# Patient Record
Sex: FEMALE | Race: WHITE | NOT HISPANIC OR LATINO | Employment: FULL TIME | URBAN - METROPOLITAN AREA
[De-identification: names, ages, dates, MRNs, and addresses within clinical notes are randomized per-mention and may not be internally consistent; named-entity substitution may affect disease eponyms.]

---

## 2015-05-14 LAB — EXTERNAL HIV SCREEN: NORMAL

## 2017-01-11 DIAGNOSIS — R73.09 OTHER ABNORMAL GLUCOSE: ICD-10-CM

## 2017-01-11 DIAGNOSIS — Z00.00 ENCOUNTER FOR GENERAL ADULT MEDICAL EXAMINATION WITHOUT ABNORMAL FINDINGS: ICD-10-CM

## 2017-01-11 DIAGNOSIS — E78.01 FAMILIAL HYPERCHOLESTEROLEMIA: ICD-10-CM

## 2017-01-11 DIAGNOSIS — R00.2 PALPITATIONS: ICD-10-CM

## 2017-04-25 ENCOUNTER — ALLSCRIPTS OFFICE VISIT (OUTPATIENT)
Dept: OTHER | Facility: OTHER | Age: 32
End: 2017-04-25

## 2017-06-14 ENCOUNTER — TRANSCRIBE ORDERS (OUTPATIENT)
Dept: ADMINISTRATIVE | Facility: HOSPITAL | Age: 32
End: 2017-06-14

## 2017-06-14 ENCOUNTER — GENERIC CONVERSION - ENCOUNTER (OUTPATIENT)
Dept: OTHER | Facility: OTHER | Age: 32
End: 2017-06-14

## 2017-06-14 DIAGNOSIS — N63.0 LUMP OR MASS IN BREAST: Primary | ICD-10-CM

## 2017-06-19 ENCOUNTER — HOSPITAL ENCOUNTER (OUTPATIENT)
Dept: ULTRASOUND IMAGING | Facility: CLINIC | Age: 32
Discharge: HOME | End: 2017-06-19
Payer: COMMERCIAL

## 2017-06-19 DIAGNOSIS — N63.0 LUMP OR MASS IN BREAST: ICD-10-CM

## 2017-06-19 PROCEDURE — 76642 ULTRASOUND BREAST LIMITED: CPT

## 2017-07-17 ENCOUNTER — ALLSCRIPTS OFFICE VISIT (OUTPATIENT)
Dept: OTHER | Facility: OTHER | Age: 32
End: 2017-07-17

## 2018-01-08 ENCOUNTER — ALLSCRIPTS OFFICE VISIT (OUTPATIENT)
Dept: OTHER | Facility: OTHER | Age: 33
End: 2018-01-08

## 2018-01-09 NOTE — PROGRESS NOTES
Assessment   1  Acute sinusitis (461 9) (J01 90)    Plan   Acute sinusitis    · Follow-up PRN Evaluation and Treatment  Follow-up  Status: Complete  Done:    [id number] 03:16PM  PMH: History of acute sinusitis    · Azithromycin 500 MG Oral Tablet; TAKE 1 TABLET DAILY UNTIL FINISHED    Discussion/Summary      Rest and fluids, start abx and Flonase prn and Mucinex D with Claritin prn  Call if worse  The patient was counseled regarding  Chief Complaint   Pt complains of having sinus pressure  Did have a lot of post nasal drip over the holidays  Does have a lot of mucus in the morning that is yellow/green  History of Present Illness   HPI: Pt complains of having sinus pressure  Did have a lot of post nasal drip over the holidays  Does have a lot of mucus in the morning that is yellow/green  Used Claritin D prn but not better  Review of Systems        Constitutional: No fever, no chills, feels well, no tiredness, no recent weight gain or loss  ENT: as noted in HPI  Cardiovascular: no complaints of slow or fast heart rate, no chest pain, no palpitations, no leg claudication or lower extremity edema  Respiratory: no complaints of shortness of breath, no wheezing, no dyspnea on exertion, no orthopnea or PND  Breasts: no complaints of breast pain, breast lump or nipple discharge  Gastrointestinal: no complaints of abdominal pain, no constipation, no nausea or diarrhea, no vomiting, no bloody stools  Genitourinary: no complaints of dysuria, no incontinence, no pelvic pain, no dysmenorrhea, no vaginal discharge or abnormal vaginal bleeding  Musculoskeletal: no complaints of arthralgia, no myalgia, no joint swelling or stiffness, no limb pain or swelling  Integumentary: no complaints of skin rash or lesion, no itching or dry skin, no skin wounds  Neurological: no complaints of headache, no confusion, no numbness or tingling, no dizziness or fainting        Active Problems   1  Abnormal blood sugar (790 29) (R73 09)   2  Familial hypercholesterolemia (272 0) (E78 01)   3  Palpitations (785 1) (R00 2)   4  Psoriasis (696 1) (L40 9)    Past Medical History   1  History of acute sinusitis (V12 69) (Z87 09)    Family History   Mother    1  Family history of hypercholesterolemia (V18 19) (Z83 42)   2  Family history of hypertension (V17 49) (Z82 49)  Father    3  Family history of diabetes mellitus (V18 0) (Z83 3)    Social History    · Never a smoker   · Non-smoker (V49 89) (Z78 9)   · Occasional alcohol use    Surgical History   1  History of Breast Surgery    Current Meds    1  Fruit & Vegetable Daily Oral Capsule; Therapy: (Deisi Rice) to Recorded   2  Mometasone Furoate 0 1 % External Cream; APPLY SPARINGLY TO AFFECTED     AREA(S) ONCE DAILY; Therapy: [id number] to (Last Rx:[id number])  Requested for: [id number] Ordered   3  Ortho Tri-Cyclen Lo 0 18/0 215/0 25 MG-25 MCG Oral Tablet; TAKE 1 TABLET DAILY; Therapy: (Recorded:[id number]) to Recorded    Allergies   1  No Known Drug Allergies    Vitals    Recorded: 18KMX2691 38:79CO   Systolic 449   Diastolic 88   Height 5 ft 6 5 in   Weight 162 lb    BMI Calculated 25 76   BSA Calculated 1 84     Physical Exam        Constitutional      General appearance: No acute distress, well appearing and well nourished  Eyes      Conjunctiva and lids: No swelling, erythema or discharge  Pupils and irises: Equal, round and reactive to light  Ears, Nose, Mouth, and Throat      External inspection of ears and nose: Normal        Otoscopic examination: Tympanic membranes translucent with normal light reflex  Canals patent without erythema  Nasal mucosa, septum, and turbinates: Abnormal  -- sinusitis  Oropharynx: Abnormal  -- pnd  Pulmonary      Respiratory effort: No increased work of breathing or signs of respiratory distress  Auscultation of lungs: Clear to auscultation  Cardiovascular      Palpation of heart: Normal PMI, no thrills  Auscultation of heart: Normal rate and rhythm, normal S1 and S2, without murmurs  Examination of extremities for edema and/or varicosities: Normal        Carotid pulses: Normal        Lymphatic      Palpation of lymph nodes in neck: No lymphadenopathy  Musculoskeletal      Gait and station: Normal        Digits and nails: Normal without clubbing or cyanosis  Inspection/palpation of joints, bones, and muscles: Normal        Skin      Skin and subcutaneous tissue: Normal without rashes or lesions  Neurologic      Cranial nerves: Cranial nerves 2-12 intact  Reflexes: 2+ and symmetric  Sensation: No sensory loss         Psychiatric      Orientation to person, place, and time: Normal        Mood and affect: Normal           Signatures    Electronically signed by : Robert Garcia DO; Jan 8 2018  3:18PM EST                       (Author)

## 2018-01-10 NOTE — PROGRESS NOTES
Assessment    1  Encounter for preventive health examination (V70 0) (Z00 00)    Plan  Health Maintenance    · Follow-up visit in 1 year Evaluation and Treatment  Follow-up  Status: Hold For -  Scheduling  Requested for: 25Apr2017    Discussion/Summary  health maintenance visit Currently, she eats an adequate diet and has an adequate exercise regimen  the risks and benefits of cervical cancer screening were discussed Breast cancer screening: the risks and benefits of breast cancer screening were discussed  Screening lab work includes hemoglobin, glucose, lipid profile, thyroid function testing and 25-hydroxyvitamin D  She was advised to be evaluated by a dentist  Advice and education were given regarding nutrition, aerobic exercise, weight bearing exercise, weight loss, reproductive health, sunscreen use, self skin examination and seat belt use  Patient discussion: discussed with the patient  General PE, discussed labs, lose weight as directed, exercise and eat healthy, f-up with Dr Serafin Cano for skin exam and GYN as directed for pap smears and SBE  Sunscreen in summer  Stress about children  Discussed labs  The patient was counseled regarding  The treatment plan was reviewed with the patient/guardian  The patient/guardian understands and agrees with the treatment plan      Chief Complaint  Pt here for yearly physical exam and review of labs  Pt also asking about seeing a dermatologist for a whole body check  Pt  states she gets overwhelmed easily,  states it is anxiety  Active Problems    1  Abnormal blood sugar (790 29) (R73 09)   2  Acute sinusitis (461 9) (J01 90)   3  Familial hypercholesterolemia (272 0) (E78 01)   4   Palpitations (785 1) (R00 2)    Surgical History    · History of Breast Surgery    Family History  Mother    · Family history of hypercholesterolemia (V18 19) (Z83 42)   · Family history of hypertension (V17 49) (Z82 49)  Father    · Family history of diabetes mellitus (V18 0) (Z83 3)    Social History    · Never a smoker   · Non-smoker (V49 89) (Z78 9)   · Occasional alcohol use    Current Meds   1  Fruit & Vegetable Daily Oral Capsule; Therapy: (Recorded:25Apr2017) to Recorded    Allergies    1  No Known Drug Allergies    Vitals   Recorded: 36NHV5085 84:02SP   Systolic 575   Diastolic 70   Height 5 ft 7 in   Weight 167 lb 4 oz   BMI Calculated 26 2   BSA Calculated 1 87     Signatures   Electronically signed by : Keena Yanez DO;  Apr 25 2017  6:11PM EST                       (Author)

## 2018-01-12 VITALS
HEIGHT: 67 IN | SYSTOLIC BLOOD PRESSURE: 138 MMHG | DIASTOLIC BLOOD PRESSURE: 88 MMHG | WEIGHT: 165.25 LBS | BODY MASS INDEX: 25.94 KG/M2

## 2018-01-13 VITALS
SYSTOLIC BLOOD PRESSURE: 124 MMHG | HEIGHT: 67 IN | DIASTOLIC BLOOD PRESSURE: 70 MMHG | WEIGHT: 167.25 LBS | BODY MASS INDEX: 26.25 KG/M2

## 2018-01-23 VITALS
DIASTOLIC BLOOD PRESSURE: 88 MMHG | SYSTOLIC BLOOD PRESSURE: 124 MMHG | BODY MASS INDEX: 25.43 KG/M2 | WEIGHT: 162 LBS | HEIGHT: 67 IN

## 2018-02-15 ENCOUNTER — TELEPHONE (OUTPATIENT)
Dept: FAMILY MEDICINE CLINIC | Facility: CLINIC | Age: 33
End: 2018-02-15

## 2018-02-15 DIAGNOSIS — J32.9 SINUSITIS, UNSPECIFIED CHRONICITY, UNSPECIFIED LOCATION: Primary | ICD-10-CM

## 2018-02-15 RX ORDER — CEFDINIR 300 MG/1
300 CAPSULE ORAL EVERY 12 HOURS SCHEDULED
Qty: 14 CAPSULE | Refills: 0 | Status: SHIPPED | OUTPATIENT
Start: 2018-02-15 | End: 2018-02-22

## 2018-02-15 NOTE — TELEPHONE ENCOUNTER
Patient is starting with another sinus infection  She has a stuffy nose, pressure, and green mucus  She has been taking Claritin D and it is not helping  She is going away for 5 days and wants to know if something can be called into [facility] in [facility]    GO#[phone] please call patient if something is called into the pharmacy

## 2018-02-26 ENCOUNTER — TELEPHONE (OUTPATIENT)
Dept: FAMILY MEDICINE CLINIC | Facility: CLINIC | Age: 33
End: 2018-02-26

## 2018-02-26 NOTE — TELEPHONE ENCOUNTER
I would recommend Optho consult for this at [facility] for sight for conjunctivitis to make sure it heals appropriately

## 2018-02-26 NOTE — TELEPHONE ENCOUNTER
Patient called and wanted to know if she could get a call back to discuss her [facility] visit over the weekend  States she went there for pink eye

## 2018-02-26 NOTE — TELEPHONE ENCOUNTER
Spoke to patient and she stated that she went to [facility] twice and had pink eye  He daughter started with it on Friday  They gave her eye drops and ended up with a reaction of burning, swelling, and felt like sandpaper inside the eye  Went back Sunday and they gave medrol dose pack and a new Ofloxacin   3% eye drop  Now swelling has gone down and feeling better, but the eyes are still red and blood shot  How long do you think it will take to clear up? Is using 1 drop every 4 hours right now

## 2018-06-14 ENCOUNTER — TELEPHONE (OUTPATIENT)
Dept: FAMILY MEDICINE CLINIC | Facility: CLINIC | Age: 33
End: 2018-06-14

## 2018-06-14 NOTE — TELEPHONE ENCOUNTER
Pt called in and scheduled yearly pe for her on  8/6/18 with you, Burton Pickett would like to know if you will be ordering for her to get blood work done?   CB# [phone]

## 2018-06-15 DIAGNOSIS — E78.01 ESSENTIAL FAMILIAL HYPERCHOLESTEROLEMIA: ICD-10-CM

## 2018-06-15 DIAGNOSIS — R73.09 ABNORMAL BLOOD SUGAR: ICD-10-CM

## 2018-06-15 DIAGNOSIS — E55.9 VITAMIN D DEFICIENCY: Primary | ICD-10-CM

## 2018-08-24 RX ORDER — NORGESTIMATE AND ETHINYL ESTRADIOL
1 KIT DAILY
Refills: 4 | COMMUNITY
Start: 2018-07-27 | End: 2020-08-28

## 2018-08-24 RX ORDER — AMOXICILLIN 250 MG
CAPSULE ORAL
COMMUNITY

## 2018-08-24 RX ORDER — MOMETASONE FUROATE 1 MG/G
CREAM TOPICAL AS NEEDED
COMMUNITY
Start: 2017-07-17 | End: 2020-12-09 | Stop reason: SDUPTHER

## 2018-08-27 ENCOUNTER — OFFICE VISIT (OUTPATIENT)
Dept: FAMILY MEDICINE CLINIC | Facility: CLINIC | Age: 33
End: 2018-08-27
Payer: COMMERCIAL

## 2018-08-27 VITALS
DIASTOLIC BLOOD PRESSURE: 88 MMHG | HEIGHT: 67 IN | SYSTOLIC BLOOD PRESSURE: 132 MMHG | WEIGHT: 157 LBS | BODY MASS INDEX: 24.64 KG/M2

## 2018-08-27 DIAGNOSIS — E55.9 VITAMIN D DEFICIENCY: ICD-10-CM

## 2018-08-27 DIAGNOSIS — Z00.00 HEALTH CARE MAINTENANCE: Primary | ICD-10-CM

## 2018-08-27 PROCEDURE — 99395 PREV VISIT EST AGE 18-39: CPT | Performed by: FAMILY MEDICINE

## 2018-08-27 NOTE — PROGRESS NOTES
Assessment/Plan:  Chief Complaint   Patient presents with    Physical Exam     discuss reaction to eye drop for pink eye  Wondering if could be an allergic reation to sulfa  Patient Instructions   Here for general pe and is doing well and has stable dry skin  Get GYN exam as directed and SBE as directed  No problem-specific Assessment & Plan notes found for this encounter  Diagnoses and all orders for this visit:    Health care maintenance    Vitamin D deficiency    Other orders  -     Omega-3 Fatty Acids (OMEGA 3 PO); Take by mouth daily          Subjective:      Patient ID: Lynda Padilla is a 26 y o  female  Here for general PE and had a recent pink eye and had allergy likely due to polymyxin sulfate drops and did see [facility] and now everything is resolved  No cp or sob, or ha  Does do yoga and strength exercises and walking  The following portions of the patient's history were reviewed and updated as appropriate: allergies, current medications, past family history, past medical history, past social history, past surgical history and problem list     Review of Systems   Constitutional: Negative  HENT: Negative  Eyes: Negative  Respiratory: Negative  Cardiovascular: Negative  Gastrointestinal: Negative  Endocrine: Negative  Genitourinary: Negative  Musculoskeletal: Negative  Skin: Negative  Dry skin in elbows and knees  Allergic/Immunologic: Negative  Neurological: Negative  Hematological: Negative  Psychiatric/Behavioral: Negative  Objective:      /88   Ht 5' 7" (1 702 m)   Wt 71 2 kg (157 lb)   BMI 24 59 kg/m²          Physical Exam   Constitutional: She is oriented to person, place, and time  She appears well-developed and well-nourished  HENT:   Head: Normocephalic and atraumatic     Right Ear: External ear normal    Left Ear: External ear normal    Nose: Nose normal    Mouth/Throat: Oropharynx is clear and moist    Eyes: Conjunctivae and EOM are normal  Pupils are equal, round, and reactive to light  Neck: Normal range of motion  Neck supple  Cardiovascular: Normal rate, regular rhythm, normal heart sounds and intact distal pulses  Pulmonary/Chest: Effort normal and breath sounds normal    Abdominal: Soft  Bowel sounds are normal    Musculoskeletal: Normal range of motion  Neurological: She is alert and oriented to person, place, and time  She has normal reflexes  Skin: Skin is warm  Dry skin on elbows and left knee   Psychiatric: She has a normal mood and affect   Her behavior is normal

## 2018-08-27 NOTE — PATIENT INSTRUCTIONS
Here for general pe and is doing well and has stable dry skin  Get GYN exam as directed and SBE as directed

## 2018-10-19 ENCOUNTER — OFFICE VISIT (OUTPATIENT)
Dept: FAMILY MEDICINE CLINIC | Facility: CLINIC | Age: 33
End: 2018-10-19
Payer: COMMERCIAL

## 2018-10-19 VITALS
SYSTOLIC BLOOD PRESSURE: 136 MMHG | DIASTOLIC BLOOD PRESSURE: 88 MMHG | WEIGHT: 143.4 LBS | HEIGHT: 67 IN | BODY MASS INDEX: 22.51 KG/M2

## 2018-10-19 DIAGNOSIS — J32.9 SINUSITIS, UNSPECIFIED CHRONICITY, UNSPECIFIED LOCATION: Primary | ICD-10-CM

## 2018-10-19 PROCEDURE — 3008F BODY MASS INDEX DOCD: CPT | Performed by: FAMILY MEDICINE

## 2018-10-19 PROCEDURE — 99213 OFFICE O/P EST LOW 20 MIN: CPT | Performed by: FAMILY MEDICINE

## 2018-10-19 PROCEDURE — 1036F TOBACCO NON-USER: CPT | Performed by: FAMILY MEDICINE

## 2018-10-19 RX ORDER — AZITHROMYCIN 500 MG/1
500 TABLET, FILM COATED ORAL DAILY
Qty: 3 TABLET | Refills: 0 | Status: SHIPPED | OUTPATIENT
Start: 2018-10-19 | End: 2018-10-22

## 2018-10-19 NOTE — PATIENT INSTRUCTIONS
Likely sinusitis  Rest and fluids, may use Flonase prn and use Claritin D prn  Start abx as directed

## 2018-10-19 NOTE — PROGRESS NOTES
Assessment/Plan:  Chief Complaint   Patient presents with    Nasal Congestion     sinus pressure since the weekend  Has green mucus  Patient Instructions   Likely sinusitis  Rest and fluids, may use Flonase prn and use Claritin D prn  Start abx as directed  No problem-specific Assessment & Plan notes found for this encounter  Diagnoses and all orders for this visit:    Sinusitis, unspecified chronicity, unspecified location  -     azithromycin (ZITHROMAX) 500 MG tablet; Take 1 tablet (500 mg total) by mouth daily for 3 days          Subjective:      Patient ID: Samantha Bolden is a 26 y o  female  Here for sinusitis  Pt  Is feeling congested and has green mucous  Used Claritin D yesterday which helped  The following portions of the patient's history were reviewed and updated as appropriate: allergies, current medications, past family history, past medical history, past social history, past surgical history and problem list     Review of Systems   Constitutional: Negative  HENT: Positive for postnasal drip and sinus pressure  Eyes: Negative  Respiratory: Negative  Cardiovascular: Negative  Gastrointestinal: Negative  Endocrine: Negative  Genitourinary: Negative  Musculoskeletal: Negative  Skin: Negative  Allergic/Immunologic: Negative  Neurological: Negative  Hematological: Negative  Psychiatric/Behavioral: Negative  Objective:      /88   Ht 5' 7" (1 702 m)   Wt 65 kg (143 lb 6 4 oz)   BMI 22 46 kg/m²          Physical Exam   Constitutional: She is oriented to person, place, and time  She appears well-developed and well-nourished  HENT:   Head: Normocephalic and atraumatic  Right Ear: External ear normal    Left Ear: External ear normal    Nasal and sinus congestion  Eyes: Pupils are equal, round, and reactive to light  Conjunctivae and EOM are normal    Neck: Normal range of motion  Neck supple     Cardiovascular: Normal rate, regular rhythm, normal heart sounds and intact distal pulses  Pulmonary/Chest: Effort normal and breath sounds normal    Musculoskeletal: Normal range of motion  Neurological: She is alert and oriented to person, place, and time  She has normal reflexes  Skin: Skin is warm and dry  Psychiatric: She has a normal mood and affect   Her behavior is normal

## 2018-12-17 ENCOUNTER — APPOINTMENT (OUTPATIENT)
Dept: LAB | Facility: CLINIC | Age: 33
End: 2018-12-17
Payer: COMMERCIAL

## 2018-12-17 ENCOUNTER — OFFICE VISIT (OUTPATIENT)
Dept: FAMILY MEDICINE CLINIC | Facility: CLINIC | Age: 33
End: 2018-12-17
Payer: COMMERCIAL

## 2018-12-17 VITALS
HEIGHT: 67 IN | WEIGHT: 149.2 LBS | SYSTOLIC BLOOD PRESSURE: 120 MMHG | DIASTOLIC BLOOD PRESSURE: 72 MMHG | BODY MASS INDEX: 23.42 KG/M2

## 2018-12-17 DIAGNOSIS — Z23 ENCOUNTER FOR IMMUNIZATION: Primary | ICD-10-CM

## 2018-12-17 DIAGNOSIS — K58.9 IRRITABLE BOWEL SYNDROME, UNSPECIFIED TYPE: ICD-10-CM

## 2018-12-17 DIAGNOSIS — R10.9 ABDOMINAL CRAMPING: ICD-10-CM

## 2018-12-17 DIAGNOSIS — R14.0 BLOATED ABDOMEN: ICD-10-CM

## 2018-12-17 DIAGNOSIS — K30 INDIGESTION: ICD-10-CM

## 2018-12-17 LAB
ALBUMIN SERPL BCP-MCNC: 4 G/DL (ref 3.5–5)
ALP SERPL-CCNC: 32 U/L (ref 46–116)
ALT SERPL W P-5'-P-CCNC: 19 U/L (ref 12–78)
ANION GAP SERPL CALCULATED.3IONS-SCNC: 6 MMOL/L (ref 4–13)
AST SERPL W P-5'-P-CCNC: 13 U/L (ref 5–45)
BASOPHILS # BLD AUTO: 0.03 THOUSANDS/ΜL (ref 0–0.1)
BASOPHILS NFR BLD AUTO: 1 % (ref 0–1)
BILIRUB SERPL-MCNC: 0.58 MG/DL (ref 0.2–1)
BUN SERPL-MCNC: 9 MG/DL (ref 5–25)
CALCIUM SERPL-MCNC: 8.7 MG/DL (ref 8.3–10.1)
CHLORIDE SERPL-SCNC: 105 MMOL/L (ref 100–108)
CO2 SERPL-SCNC: 25 MMOL/L (ref 21–32)
CREAT SERPL-MCNC: 0.94 MG/DL (ref 0.6–1.3)
EOSINOPHIL # BLD AUTO: 0.04 THOUSAND/ΜL (ref 0–0.61)
EOSINOPHIL NFR BLD AUTO: 1 % (ref 0–6)
ERYTHROCYTE [DISTWIDTH] IN BLOOD BY AUTOMATED COUNT: 11.7 % (ref 11.6–15.1)
GFR SERPL CREATININE-BSD FRML MDRD: 79 ML/MIN/1.73SQ M
GLUCOSE P FAST SERPL-MCNC: 92 MG/DL (ref 65–99)
HCT VFR BLD AUTO: 44.3 % (ref 34.8–46.1)
HGB BLD-MCNC: 14.7 G/DL (ref 11.5–15.4)
IMM GRANULOCYTES # BLD AUTO: 0.01 THOUSAND/UL (ref 0–0.2)
IMM GRANULOCYTES NFR BLD AUTO: 0 % (ref 0–2)
LYMPHOCYTES # BLD AUTO: 1.47 THOUSANDS/ΜL (ref 0.6–4.47)
LYMPHOCYTES NFR BLD AUTO: 31 % (ref 14–44)
MCH RBC QN AUTO: 30.4 PG (ref 26.8–34.3)
MCHC RBC AUTO-ENTMCNC: 33.2 G/DL (ref 31.4–37.4)
MCV RBC AUTO: 92 FL (ref 82–98)
MONOCYTES # BLD AUTO: 0.21 THOUSAND/ΜL (ref 0.17–1.22)
MONOCYTES NFR BLD AUTO: 4 % (ref 4–12)
NEUTROPHILS # BLD AUTO: 3.04 THOUSANDS/ΜL (ref 1.85–7.62)
NEUTS SEG NFR BLD AUTO: 63 % (ref 43–75)
NRBC BLD AUTO-RTO: 0 /100 WBCS
PLATELET # BLD AUTO: 232 THOUSANDS/UL (ref 149–390)
PMV BLD AUTO: 10.9 FL (ref 8.9–12.7)
POTASSIUM SERPL-SCNC: 3.5 MMOL/L (ref 3.5–5.3)
PROT SERPL-MCNC: 7 G/DL (ref 6.4–8.2)
RBC # BLD AUTO: 4.84 MILLION/UL (ref 3.81–5.12)
SODIUM SERPL-SCNC: 136 MMOL/L (ref 136–145)
T4 FREE SERPL-MCNC: 1.08 NG/DL (ref 0.76–1.46)
TSH SERPL DL<=0.05 MIU/L-ACNC: 3.55 UIU/ML (ref 0.36–3.74)
WBC # BLD AUTO: 4.8 THOUSAND/UL (ref 4.31–10.16)

## 2018-12-17 PROCEDURE — 85025 COMPLETE CBC W/AUTO DIFF WBC: CPT

## 2018-12-17 PROCEDURE — 83516 IMMUNOASSAY NONANTIBODY: CPT

## 2018-12-17 PROCEDURE — 36415 COLL VENOUS BLD VENIPUNCTURE: CPT

## 2018-12-17 PROCEDURE — 1036F TOBACCO NON-USER: CPT | Performed by: FAMILY MEDICINE

## 2018-12-17 PROCEDURE — 3008F BODY MASS INDEX DOCD: CPT | Performed by: FAMILY MEDICINE

## 2018-12-17 PROCEDURE — 84439 ASSAY OF FREE THYROXINE: CPT | Performed by: FAMILY MEDICINE

## 2018-12-17 PROCEDURE — 82784 ASSAY IGA/IGD/IGG/IGM EACH: CPT

## 2018-12-17 PROCEDURE — 80053 COMPREHEN METABOLIC PANEL: CPT

## 2018-12-17 PROCEDURE — 84443 ASSAY THYROID STIM HORMONE: CPT

## 2018-12-17 PROCEDURE — 86255 FLUORESCENT ANTIBODY SCREEN: CPT

## 2018-12-17 PROCEDURE — 99214 OFFICE O/P EST MOD 30 MIN: CPT | Performed by: FAMILY MEDICINE

## 2018-12-17 RX ORDER — DICYCLOMINE HCL 20 MG
20 TABLET ORAL EVERY 6 HOURS
Qty: 30 TABLET | Refills: 0 | Status: SHIPPED | OUTPATIENT
Start: 2018-12-17 | End: 2022-03-15

## 2018-12-17 NOTE — PATIENT INSTRUCTIONS
Here for GI issues with bloating and Irregular BM's  Hx of stress and anxiety  Concerned about this and will try Bentyl for bloated feeling and also check stool culture and O&P  Rec  Fiber in diet  Rec  GI consult at [facility] if not better and monitor for blood in stool and increased abdominal pain  Stay well hydrated

## 2018-12-17 NOTE — PROGRESS NOTES
Assessment/Plan:  Chief Complaint   Patient presents with    Constipation     noticing some digestion changes since traveling over [facility]  When away was feeling a little constipated and now for the last 2 weeks has been going differently since being home  Does not have diarrhea, but is slightly different  Patient Instructions   Here for GI issues with bloating and Irregular BM's  Hx of stress and anxiety  Concerned about this and will try Bentyl for bloated feeling and also check stool culture and O&P  Rec  Fiber in diet  Rec  GI consult at [facility] if not better and monitor for blood in stool and increased abdominal pain  Stay well hydrated  No problem-specific Assessment & Plan notes found for this encounter  Diagnoses and all orders for this visit:    Encounter for immunization  -     SYRINGE/SINGLE-DOSE VIAL: influenza vaccine, [id number], quadrivalent, 0 5 mL, preservative-free (AFLURIA, FLUARIX, FLULAVAL, FLUZONE)    Indigestion  -     Stool Enteric Bacterial Panel by PCR; Future  -     Ova and Parasites, Conc/Perm Smear, 3 Spec; Future  -     Comprehensive metabolic panel; Future  -     CBC and differential; Future  -     TSH, 3rd generation; Future  -     T4, free  -     Celiac Disease Antibody Profile; Future    Bloated abdomen  -     Stool Enteric Bacterial Panel by PCR; Future  -     Ova and Parasites, Conc/Perm Smear, 3 Spec; Future  -     Comprehensive metabolic panel; Future  -     CBC and differential; Future  -     TSH, 3rd generation; Future  -     T4, free  -     Celiac Disease Antibody Profile; Future    Abdominal cramping  -     Stool Enteric Bacterial Panel by PCR; Future  -     Ova and Parasites, Conc/Perm Smear, 3 Spec; Future  -     Comprehensive metabolic panel; Future  -     CBC and differential; Future  -     TSH, 3rd generation; Future  -     T4, free  -     Celiac Disease Antibody Profile;  Future    Irritable bowel syndrome, unspecified type  - dicyclomine (BENTYL) 20 mg tablet; Take 1 tablet (20 mg total) by mouth every 6 (six) hours  -     Stool Enteric Bacterial Panel by PCR; Future  -     Ova and Parasites, Conc/Perm Smear, 3 Spec; Future  -     Comprehensive metabolic panel; Future  -     CBC and differential; Future  -     TSH, 3rd generation; Future  -     T4, free  -     Celiac Disease Antibody Profile; Future          Subjective:      Patient ID: Brinda Vallejo is a 26 y o  female  Constipation (noticing some digestion changes since traveling over [facility]  When away was feeling a little constipated and now for the last 2 weeks has been going differently since being home  Does not have diarrhea, but is slightly different  For 4 days when she returned had some cramping and poor appetite and had multiple times a day going to bathroom  Has had an arugala salad  No fever or chills and stools are normal  Not constipated  Irregular frequency of BM's  Has stress and struggles with stress and anxiety around holidays  Tried to avoid gluten  The following portions of the patient's history were reviewed and updated as appropriate: allergies, current medications, past family history, past medical history, past social history, past surgical history and problem list     Review of Systems   Constitutional: Negative  HENT: Negative  Eyes: Negative  Respiratory: Negative  Cardiovascular: Negative  Gastrointestinal:        Digestion issues, at times bloated and irregular BM's   Endocrine: Negative  Genitourinary: Negative  Musculoskeletal: Negative  Skin: Negative  Allergic/Immunologic: Negative  Neurological: Negative  Hematological: Negative  Psychiatric/Behavioral: Negative  Objective:      /72   Ht 5' 7" (1 702 m)   Wt 67 7 kg (149 lb 3 2 oz)   BMI 23 37 kg/m²          Physical Exam   Constitutional: She is oriented to person, place, and time  She appears well-developed and well-nourished     HENT: Head: Normocephalic and atraumatic  Right Ear: External ear normal    Left Ear: External ear normal    Nose: Nose normal    Mouth/Throat: Oropharynx is clear and moist    Eyes: Pupils are equal, round, and reactive to light  Conjunctivae and EOM are normal    Neck: Normal range of motion  Neck supple  Cardiovascular: Normal rate, regular rhythm, normal heart sounds and intact distal pulses  Pulmonary/Chest: Effort normal and breath sounds normal    Abdominal: Soft  Bowel sounds are normal    Musculoskeletal: Normal range of motion  Neurological: She is alert and oriented to person, place, and time  She has normal reflexes  Skin: Skin is warm and dry  Psychiatric: She has a normal mood and affect   Her behavior is normal

## 2018-12-18 ENCOUNTER — APPOINTMENT (OUTPATIENT)
Dept: LAB | Facility: CLINIC | Age: 33
End: 2018-12-18
Payer: COMMERCIAL

## 2018-12-18 DIAGNOSIS — K30 MILD DIETARY INDIGESTION: Primary | ICD-10-CM

## 2018-12-18 DIAGNOSIS — R10.9 STOMACH ACHE: ICD-10-CM

## 2018-12-18 DIAGNOSIS — K58.9 IRRITABLE BOWEL SYNDROME, UNSPECIFIED TYPE: ICD-10-CM

## 2018-12-18 DIAGNOSIS — R14.0 ABDOMINAL DISTENTION: ICD-10-CM

## 2018-12-18 LAB
CAMPYLOBACTER DNA SPEC NAA+PROBE: NORMAL
ENDOMYSIUM IGA SER QL: NEGATIVE
GLIADIN PEPTIDE IGA SER-ACNC: 2 UNITS (ref 0–19)
GLIADIN PEPTIDE IGG SER-ACNC: 2 UNITS (ref 0–19)
IGA SERPL-MCNC: 96 MG/DL (ref 87–352)
SALMONELLA DNA SPEC QL NAA+PROBE: NORMAL
SHIGA TOXIN STX GENE SPEC NAA+PROBE: NORMAL
SHIGELLA DNA SPEC QL NAA+PROBE: NORMAL
TTG IGA SER-ACNC: <2 U/ML (ref 0–3)
TTG IGG SER-ACNC: <2 U/ML (ref 0–5)

## 2018-12-18 PROCEDURE — 87209 SMEAR COMPLEX STAIN: CPT

## 2018-12-18 PROCEDURE — 87177 OVA AND PARASITES SMEARS: CPT

## 2018-12-18 PROCEDURE — 87505 NFCT AGENT DETECTION GI: CPT

## 2018-12-21 LAB — O+P STL CONC: NORMAL

## 2019-01-09 ENCOUNTER — OFFICE VISIT (OUTPATIENT)
Dept: FAMILY MEDICINE CLINIC | Facility: CLINIC | Age: 34
End: 2019-01-09
Payer: COMMERCIAL

## 2019-01-09 VITALS
HEIGHT: 67 IN | WEIGHT: 151.6 LBS | DIASTOLIC BLOOD PRESSURE: 80 MMHG | SYSTOLIC BLOOD PRESSURE: 124 MMHG | BODY MASS INDEX: 23.79 KG/M2

## 2019-01-09 DIAGNOSIS — K59.00 CONSTIPATION, UNSPECIFIED CONSTIPATION TYPE: ICD-10-CM

## 2019-01-09 DIAGNOSIS — K58.9 IRRITABLE BOWEL SYNDROME, UNSPECIFIED TYPE: ICD-10-CM

## 2019-01-09 DIAGNOSIS — Z23 ENCOUNTER FOR IMMUNIZATION: Primary | ICD-10-CM

## 2019-01-09 PROCEDURE — 1036F TOBACCO NON-USER: CPT | Performed by: FAMILY MEDICINE

## 2019-01-09 PROCEDURE — 3008F BODY MASS INDEX DOCD: CPT | Performed by: FAMILY MEDICINE

## 2019-01-09 PROCEDURE — 99214 OFFICE O/P EST MOD 30 MIN: CPT | Performed by: FAMILY MEDICINE

## 2019-01-09 NOTE — PATIENT INSTRUCTIONS
Here for constipation and IBS f-up, labs and stool tests wnl, she is now feeling better and is having regular BM's  She has worked at [employer] and is considering a job change as this may be affecting her health  Eat healthy and exercise as directed  Call if any problems and she is considering stopping her BCP

## 2019-01-09 NOTE — PROGRESS NOTES
Assessment/Plan:  Chief Complaint   Patient presents with    Follow-up     3 weeks; no concerns     Patient Instructions   Here for constipation and IBS f-up, labs and stool tests wnl, she is now feeling better and is having regular BM's  She has worked at [employer] and is considering a job change as this may be affecting her health  Eat healthy and exercise as directed  Call if any problems and she is considering stopping her BCP  No problem-specific Assessment & Plan notes found for this encounter  Diagnoses and all orders for this visit:    Encounter for immunization  -     SYRINGE/SINGLE-DOSE VIAL: influenza vaccine, [id number], quadrivalent, 0 5 mL, preservative-free (AFLURIA, FLUARIX, FLULAVAL, FLUZONE)    Irritable bowel syndrome, unspecified type    Constipation, unspecified constipation type          Subjective:      Patient ID: Osman Pennington is a 26 y o  female  Follow-up (3 weeks; no concerns) never took Bentyl  She is better and had 15 days off for [place] and felt better  Job is stressful and this may have caused GI issues  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  Stress at work, new boss recently and gerardo last September and commute and children and big recent travel could have caused her to have these GI complaints  The following portions of the patient's history were reviewed and updated as appropriate: allergies, current medications, past family history, past medical history, past social history, past surgical history and problem list     Review of Systems   Constitutional: Negative  HENT: Negative  Eyes: Negative  Respiratory: Negative  Cardiovascular: Negative  Gastrointestinal: Negative  Endocrine: Negative  Genitourinary: Negative  Musculoskeletal: Negative  Skin: Negative  Allergic/Immunologic: Negative  Neurological: Negative  Hematological: Negative  Psychiatric/Behavioral: Negative            Objective:      /80 (BP Location: Left arm, Patient Position: Sitting, Cuff Size: Standard)   Ht 5' 7" (1 702 m)   Wt 68 8 kg (151 lb 9 6 oz)   BMI 23 74 kg/m²          Physical Exam   Constitutional: She is oriented to person, place, and time  She appears well-developed and well-nourished  HENT:   Head: Normocephalic and atraumatic  Right Ear: External ear normal    Left Ear: External ear normal    Nose: Nose normal    Mouth/Throat: Oropharynx is clear and moist    Eyes: Pupils are equal, round, and reactive to light  Conjunctivae and EOM are normal    Neck: Normal range of motion  Neck supple  Cardiovascular: Normal rate, regular rhythm, normal heart sounds and intact distal pulses  Pulmonary/Chest: Effort normal and breath sounds normal    Musculoskeletal: Normal range of motion  Neurological: She is alert and oriented to person, place, and time  She has normal reflexes  Skin: Skin is warm and dry  Psychiatric: She has a normal mood and affect   Her behavior is normal

## 2019-08-20 ENCOUNTER — TRANSCRIBE ORDERS (OUTPATIENT)
Dept: FAMILY MEDICINE CLINIC | Facility: CLINIC | Age: 34
End: 2019-08-20

## 2019-08-20 ENCOUNTER — TELEPHONE (OUTPATIENT)
Dept: FAMILY MEDICINE CLINIC | Facility: CLINIC | Age: 34
End: 2019-08-20

## 2019-08-20 DIAGNOSIS — I15.9 SECONDARY HYPERTENSION: Primary | ICD-10-CM

## 2019-08-20 DIAGNOSIS — R73.09 ABNORMAL BLOOD SUGAR: ICD-10-CM

## 2019-08-20 NOTE — TELEPHONE ENCOUNTER
Patient is scheduled for a physical on 9/10  Do you want her to have blood work done prior?     Please mail

## 2019-09-30 ENCOUNTER — OFFICE VISIT (OUTPATIENT)
Dept: FAMILY MEDICINE CLINIC | Facility: CLINIC | Age: 34
End: 2019-09-30
Payer: COMMERCIAL

## 2019-09-30 VITALS
TEMPERATURE: 97.8 F | HEIGHT: 68 IN | OXYGEN SATURATION: 97 % | DIASTOLIC BLOOD PRESSURE: 76 MMHG | HEART RATE: 78 BPM | WEIGHT: 153.8 LBS | SYSTOLIC BLOOD PRESSURE: 128 MMHG | BODY MASS INDEX: 23.31 KG/M2

## 2019-09-30 DIAGNOSIS — Z00.00 HEALTH CARE MAINTENANCE: Primary | ICD-10-CM

## 2019-09-30 PROCEDURE — 99395 PREV VISIT EST AGE 18-39: CPT | Performed by: FAMILY MEDICINE

## 2019-09-30 RX ORDER — DIPHENOXYLATE HYDROCHLORIDE AND ATROPINE SULFATE 2.5; .025 MG/1; MG/1
1 TABLET ORAL DAILY
COMMUNITY

## 2019-09-30 NOTE — PROGRESS NOTES
Assessment/Plan:  Chief Complaint   Patient presents with    Annual Exam     Patient Instructions   Here for general PE and is doing well and labs excellent, preventive medicine discussed  Recheck yearly for General PE and monitor daily for possible GI lactose intolerance  No problem-specific Assessment & Plan notes found for this encounter  Diagnoses and all orders for this visit:    Health care maintenance    Other orders  -     Probiotic Product (PROBIOTIC DAILY PO); Take by mouth  -     multivitamin (THERAGRAN) TABS; Take 1 tablet by mouth daily          Subjective:      Patient ID: Ankit Rdz is a 37 y o  female  Annual Exam No cp or sob, or ha, and here to review labs and does yoga  Sees GYN as directed and does SBE  Diet and exercise  Sees dentist as directed  The following portions of the patient's history were reviewed and updated as appropriate: allergies, current medications, past family history, past medical history, past social history, past surgical history and problem list     Review of Systems   Constitutional: Negative  HENT: Negative  Eyes: Negative  Respiratory: Negative  Cardiovascular: Negative  Gastrointestinal: Negative  Endocrine: Negative  Genitourinary: Negative  Musculoskeletal: Negative  Skin: Negative  Allergic/Immunologic: Negative  Neurological: Negative  Hematological: Negative  Psychiatric/Behavioral: Negative  Objective:      /76 (BP Location: Left arm, Patient Position: Sitting, Cuff Size: Adult)   Pulse 78   Temp 97 8 °F (36 6 °C) (Temporal)   Ht 5' 7 5" (1 715 m)   Wt 69 8 kg (153 lb 12 8 oz)   SpO2 97%   BMI 23 73 kg/m²          Physical Exam   Constitutional: She is oriented to person, place, and time  She appears well-developed and well-nourished  HENT:   Head: Normocephalic and atraumatic     Right Ear: External ear normal    Left Ear: External ear normal    Nose: Nose normal    Mouth/Throat: Oropharynx is clear and moist    Eyes: Pupils are equal, round, and reactive to light  Conjunctivae and EOM are normal    Neck: Normal range of motion  Neck supple  Cardiovascular: Normal rate, regular rhythm, normal heart sounds and intact distal pulses  Pulmonary/Chest: Effort normal and breath sounds normal    Musculoskeletal: Normal range of motion  Neurological: She is alert and oriented to person, place, and time  She has normal reflexes  Skin: Skin is warm and dry  Psychiatric: She has a normal mood and affect   Her behavior is normal

## 2019-09-30 NOTE — PATIENT INSTRUCTIONS
Here for general PE and is doing well and labs excellent, preventive medicine discussed  Recheck yearly for General PE and monitor daily for possible GI lactose intolerance

## 2019-10-21 ENCOUNTER — OFFICE VISIT (OUTPATIENT)
Dept: FAMILY MEDICINE CLINIC | Facility: CLINIC | Age: 34
End: 2019-10-21
Payer: COMMERCIAL

## 2019-10-21 VITALS
WEIGHT: 155.6 LBS | BODY MASS INDEX: 23.58 KG/M2 | HEART RATE: 92 BPM | SYSTOLIC BLOOD PRESSURE: 122 MMHG | OXYGEN SATURATION: 100 % | TEMPERATURE: 97.7 F | HEIGHT: 68 IN | DIASTOLIC BLOOD PRESSURE: 78 MMHG

## 2019-10-21 DIAGNOSIS — J32.9 SINUSITIS, UNSPECIFIED CHRONICITY, UNSPECIFIED LOCATION: Primary | ICD-10-CM

## 2019-10-21 DIAGNOSIS — R09.81 NASAL CONGESTION: ICD-10-CM

## 2019-10-21 PROCEDURE — 99213 OFFICE O/P EST LOW 20 MIN: CPT | Performed by: FAMILY MEDICINE

## 2019-10-21 PROCEDURE — 3008F BODY MASS INDEX DOCD: CPT | Performed by: FAMILY MEDICINE

## 2019-10-21 RX ORDER — AZITHROMYCIN 500 MG/1
500 TABLET, FILM COATED ORAL DAILY
Qty: 3 TABLET | Refills: 0 | Status: SHIPPED | OUTPATIENT
Start: 2019-10-21 | End: 2019-10-24

## 2019-10-21 RX ORDER — FLUTICASONE PROPIONATE 50 MCG
2 SPRAY, SUSPENSION (ML) NASAL DAILY
Qty: 1 BOTTLE | Refills: 0 | Status: SHIPPED | OUTPATIENT
Start: 2019-10-21 | End: 2022-03-15

## 2019-10-21 NOTE — PROGRESS NOTES
Assessment/Plan:  Chief Complaint   Patient presents with    Possible sinus infection     Patient Instructions   Rest and fluids and start abx and Fluticasone and Claritin D prn congestion and call if worse  No problem-specific Assessment & Plan notes found for this encounter  Diagnoses and all orders for this visit:    Sinusitis, unspecified chronicity, unspecified location  -     azithromycin (ZITHROMAX) 500 MG tablet; Take 1 tablet (500 mg total) by mouth daily for 3 days  -     fluticasone (FLONASE) 50 mcg/act nasal spray; 2 sprays into each nostril daily Prn sinusitis    Nasal congestion  -     fluticasone (FLONASE) 50 mcg/act nasal spray; 2 sprays into each nostril daily Prn sinusitis          Subjective:      Patient ID: Magi Conway is a 37 y o  female  Possible sinus infection , left nasal passage is congested and also has more pressure left maxillary sinus  No drainage  Had a cold previously 2 weeks ago for several days with congestion and took sudafed for 4 days  The following portions of the patient's history were reviewed and updated as appropriate: allergies, current medications, past family history, past medical history, past social history, past surgical history and problem list     Review of Systems   Constitutional: Negative  HENT: Positive for congestion, sinus pressure and sinus pain  Eyes: Negative  Respiratory: Negative  Cardiovascular: Negative  Gastrointestinal: Negative  Endocrine: Negative  Genitourinary: Negative  Musculoskeletal: Negative  Skin: Negative  Allergic/Immunologic: Negative  Neurological: Negative  Hematological: Negative  Psychiatric/Behavioral: Negative            Objective:      /78 (BP Location: Left arm, Patient Position: Sitting, Cuff Size: Adult)   Pulse 92   Temp 97 7 °F (36 5 °C) (Temporal)   Ht 5' 7 5" (1 715 m)   Wt 70 6 kg (155 lb 9 6 oz)   SpO2 100%   BMI 24 01 kg/m²          Physical Exam Constitutional: She is oriented to person, place, and time  She appears well-developed and well-nourished  HENT:   Head: Normocephalic and atraumatic  Right Ear: External ear normal    Left Ear: External ear normal    Sinus tenderness and congestion, nasal congestion  Eyes: Pupils are equal, round, and reactive to light  Conjunctivae and EOM are normal    Neck: Normal range of motion  Neck supple  Cardiovascular: Normal rate, regular rhythm, normal heart sounds and intact distal pulses  Pulmonary/Chest: Effort normal and breath sounds normal    Musculoskeletal: Normal range of motion  Neurological: She is alert and oriented to person, place, and time  She has normal reflexes  Skin: Skin is warm and dry  Psychiatric: She has a normal mood and affect   Her behavior is normal

## 2019-11-26 ENCOUNTER — TRANSCRIBE ORDERS (OUTPATIENT)
Dept: ADMINISTRATIVE | Facility: HOSPITAL | Age: 34
End: 2019-11-26

## 2019-11-26 DIAGNOSIS — N63.0 LUMP OR MASS IN BREAST: Primary | ICD-10-CM

## 2019-12-03 ENCOUNTER — HOSPITAL ENCOUNTER (OUTPATIENT)
Dept: ULTRASOUND IMAGING | Facility: CLINIC | Age: 34
Discharge: HOME | End: 2019-12-03
Payer: COMMERCIAL

## 2019-12-03 ENCOUNTER — HOSPITAL ENCOUNTER (OUTPATIENT)
Dept: MAMMOGRAPHY | Facility: CLINIC | Age: 34
Discharge: HOME | End: 2019-12-03
Payer: COMMERCIAL

## 2019-12-03 VITALS — WEIGHT: 152 LBS | BODY MASS INDEX: 23.04 KG/M2 | HEIGHT: 68 IN

## 2019-12-03 DIAGNOSIS — N63.0 LUMP OR MASS IN BREAST: ICD-10-CM

## 2019-12-03 PROCEDURE — 77066 DX MAMMO INCL CAD BI: CPT

## 2019-12-03 PROCEDURE — 76642 ULTRASOUND BREAST LIMITED: CPT

## 2019-12-03 PROCEDURE — G0279 TOMOSYNTHESIS, MAMMO: HCPCS

## 2019-12-20 ENCOUNTER — OFFICE VISIT (OUTPATIENT)
Dept: FAMILY MEDICINE CLINIC | Facility: CLINIC | Age: 34
End: 2019-12-20
Payer: COMMERCIAL

## 2019-12-20 VITALS
HEIGHT: 67 IN | BODY MASS INDEX: 24.3 KG/M2 | TEMPERATURE: 98.5 F | WEIGHT: 154.8 LBS | OXYGEN SATURATION: 98 % | DIASTOLIC BLOOD PRESSURE: 68 MMHG | SYSTOLIC BLOOD PRESSURE: 122 MMHG | RESPIRATION RATE: 17 BRPM | HEART RATE: 100 BPM

## 2019-12-20 DIAGNOSIS — K14.6 TONGUE PAIN: ICD-10-CM

## 2019-12-20 DIAGNOSIS — J04.0 LARYNGITIS: ICD-10-CM

## 2019-12-20 DIAGNOSIS — R05.9 COUGH: Primary | ICD-10-CM

## 2019-12-20 PROCEDURE — 1036F TOBACCO NON-USER: CPT | Performed by: FAMILY MEDICINE

## 2019-12-20 PROCEDURE — 3008F BODY MASS INDEX DOCD: CPT | Performed by: FAMILY MEDICINE

## 2019-12-20 PROCEDURE — 99213 OFFICE O/P EST LOW 20 MIN: CPT | Performed by: FAMILY MEDICINE

## 2019-12-20 RX ORDER — AZITHROMYCIN 250 MG/1
TABLET, FILM COATED ORAL
Qty: 6 TABLET | Refills: 0 | Status: SHIPPED | OUTPATIENT
Start: 2019-12-20 | End: 2019-12-25

## 2019-12-20 NOTE — PATIENT INSTRUCTIONS
Rest and fluids, start abx and also take Dimetapp DM cold and cough  Rest voice  Call if any problems

## 2019-12-20 NOTE — PROGRESS NOTES
Assessment/Plan:  Chief Complaint   Patient presents with    Laryngitis     Pt c/o loss of voice   patrinstr      No problem-specific Assessment & Plan notes found for this encounter  Diagnoses and all orders for this visit:    Cough  -     azithromycin (ZITHROMAX) 250 mg tablet; Take 2 tablets today then 1 tablet daily x 4 days    Laryngitis    Tongue pain          Subjective:      Patient ID: Margarita Shoemaker is a 37 y o  female  Laryngitis (Pt c/o loss of voice  ) Upper chest congestion and laryngitis and back of tongue pain  No cp or sob, or ha  No fever or chills  Had a sore throat last week  The following portions of the patient's history were reviewed and updated as appropriate: allergies, current medications, past family history, past medical history, past social history, past surgical history and problem list     Review of Systems   Constitutional: Negative  HENT:        Laryngitis, pnd and tongue in back of tongue hurts   Eyes: Negative  Respiratory: Positive for cough (cough last week)  Cardiovascular: Negative  Gastrointestinal: Negative  Endocrine: Negative  Genitourinary: Negative  Musculoskeletal: Negative  Skin: Negative  Allergic/Immunologic: Negative  Neurological: Negative  Hematological: Negative  Psychiatric/Behavioral: Negative  Objective:      /68 (BP Location: Left arm, Patient Position: Sitting, Cuff Size: Adult)   Pulse 100   Temp 98 5 °F (36 9 °C) (Temporal)   Resp 17   Ht 5' 6 73" (1 695 m)   Wt 70 2 kg (154 lb 12 8 oz)   SpO2 98%   BMI 24 44 kg/m²          Physical Exam   Constitutional: She is oriented to person, place, and time  She appears well-developed and well-nourished  HENT:   Head: Normocephalic and atraumatic     Right Ear: External ear normal    Left Ear: External ear normal    Nose: Nose normal    Mouth/Throat: Oropharynx is clear and moist    laryngitis   Eyes: Pupils are equal, round, and reactive to light  Conjunctivae and EOM are normal    Neck: Normal range of motion  Neck supple  Cardiovascular: Normal rate, regular rhythm, normal heart sounds and intact distal pulses  Pulmonary/Chest: Effort normal and breath sounds normal    Cough better   Musculoskeletal: Normal range of motion  Neurological: She is alert and oriented to person, place, and time  She has normal reflexes  Skin: Skin is warm and dry  Psychiatric: She has a normal mood and affect   Her behavior is normal

## 2020-08-28 ENCOUNTER — OFFICE VISIT (OUTPATIENT)
Dept: FAMILY MEDICINE CLINIC | Facility: CLINIC | Age: 35
End: 2020-08-28
Payer: COMMERCIAL

## 2020-08-28 VITALS
HEIGHT: 67 IN | TEMPERATURE: 97.5 F | BODY MASS INDEX: 24.92 KG/M2 | OXYGEN SATURATION: 99 % | WEIGHT: 158.8 LBS | HEART RATE: 64 BPM | SYSTOLIC BLOOD PRESSURE: 118 MMHG | RESPIRATION RATE: 16 BRPM | DIASTOLIC BLOOD PRESSURE: 80 MMHG

## 2020-08-28 DIAGNOSIS — J30.2 SEASONAL ALLERGIES: ICD-10-CM

## 2020-08-28 DIAGNOSIS — J32.9 SINUSITIS, UNSPECIFIED CHRONICITY, UNSPECIFIED LOCATION: Primary | ICD-10-CM

## 2020-08-28 PROCEDURE — 3725F SCREEN DEPRESSION PERFORMED: CPT | Performed by: FAMILY MEDICINE

## 2020-08-28 PROCEDURE — 1036F TOBACCO NON-USER: CPT | Performed by: FAMILY MEDICINE

## 2020-08-28 PROCEDURE — 99213 OFFICE O/P EST LOW 20 MIN: CPT | Performed by: FAMILY MEDICINE

## 2020-08-28 PROCEDURE — 3008F BODY MASS INDEX DOCD: CPT | Performed by: FAMILY MEDICINE

## 2020-08-28 RX ORDER — FLUTICASONE PROPIONATE 50 MCG
2 SPRAY, SUSPENSION (ML) NASAL DAILY
Qty: 1 BOTTLE | Refills: 1 | Status: SHIPPED | OUTPATIENT
Start: 2020-08-28

## 2020-08-28 RX ORDER — AZITHROMYCIN 250 MG/1
TABLET, FILM COATED ORAL
Qty: 6 TABLET | Refills: 0 | Status: SHIPPED | OUTPATIENT
Start: 2020-08-28 | End: 2020-09-01

## 2020-08-28 RX ORDER — PREDNISONE 10 MG/1
TABLET ORAL
Qty: 21 TABLET | Refills: 0 | Status: SHIPPED | OUTPATIENT
Start: 2020-08-28 | End: 2022-03-15

## 2020-08-28 NOTE — PROGRESS NOTES
Assessment/Plan:  Chief Complaint   Patient presents with    Sinus Problem     sinus pressure arounds eyes and drainage  pt taking OTC claritin -d and used lori pot      Patient Instructions   Rest and fluids, start abx and prednisone and Fluticasone prn  May use Claritin or zyrtec prn seasonal allergies  No problem-specific Assessment & Plan notes found for this encounter  Diagnoses and all orders for this visit:    Sinusitis, unspecified chronicity, unspecified location  -     azithromycin (ZITHROMAX) 250 mg tablet; Take 2 tablets today then 1 tablet daily x 4 days  -     predniSONE 10 mg tablet; Take 60 mg po day#1, 50 mg po day#2, 40 mg po day#3, 30 mg po day#4, 20 mg po day#5m and 10 mg po day#6  -     fluticasone (FLONASE) 50 mcg/act nasal spray; 2 sprays into each nostril daily Prn sinusitis    Seasonal allergies  -     azithromycin (ZITHROMAX) 250 mg tablet; Take 2 tablets today then 1 tablet daily x 4 days  -     predniSONE 10 mg tablet; Take 60 mg po day#1, 50 mg po day#2, 40 mg po day#3, 30 mg po day#4, 20 mg po day#5m and 10 mg po day#6  -     fluticasone (FLONASE) 50 mcg/act nasal spray; 2 sprays into each nostril daily Prn sinusitis          Subjective:      Patient ID: Vito England is a 38 y o  female  Sinus Problem (sinus pressure arounds eyes and drainage  pt taking OTC claritin -d and used lori pot ) No fever  The following portions of the patient's history were reviewed and updated as appropriate: allergies, current medications, past family history, past medical history, past social history, past surgical history and problem list     Review of Systems   Constitutional: Negative  HENT: Positive for congestion and sinus pressure  Eyes: Negative  Respiratory: Negative  Cardiovascular: Negative  Gastrointestinal: Negative  Endocrine: Negative  Genitourinary: Negative  Musculoskeletal: Negative  Skin: Negative  Allergic/Immunologic: Negative  Neurological: Negative  Hematological: Negative  Psychiatric/Behavioral: Negative  Objective:      /80   Pulse 64   Temp 97 5 °F (36 4 °C) (Temporal)   Resp 16   Ht 5' 6 7" (1 694 m)   Wt 72 kg (158 lb 12 8 oz)   SpO2 99%   BMI 25 10 kg/m²          Physical Exam  Constitutional:       Appearance: She is well-developed  HENT:      Head: Normocephalic and atraumatic  Right Ear: External ear normal       Left Ear: External ear normal       Nose: Nose normal       Comments: Sinusitis, sinus pressure b/l maxillary sinuses  Eyes:      Conjunctiva/sclera: Conjunctivae normal       Pupils: Pupils are equal, round, and reactive to light  Neck:      Musculoskeletal: Normal range of motion and neck supple  Cardiovascular:      Rate and Rhythm: Normal rate and regular rhythm  Heart sounds: Normal heart sounds  Pulmonary:      Effort: Pulmonary effort is normal       Breath sounds: Normal breath sounds  Musculoskeletal: Normal range of motion  Skin:     General: Skin is warm and dry  Neurological:      Mental Status: She is alert and oriented to person, place, and time  Deep Tendon Reflexes: Reflexes are normal and symmetric     Psychiatric:         Behavior: Behavior normal

## 2020-08-28 NOTE — PATIENT INSTRUCTIONS
Rest and fluids, start abx and prednisone and Fluticasone prn  May use Claritin or zyrtec prn seasonal allergies

## 2020-08-31 ENCOUNTER — TELEPHONE (OUTPATIENT)
Dept: ADMINISTRATIVE | Facility: OTHER | Age: 35
End: 2020-08-31

## 2020-08-31 NOTE — TELEPHONE ENCOUNTER
----- Message from Marvin Holland sent at 8/28/2020  1:49 PM EDT -----  Regarding: Pap test  08/28/20 1:49 PM    Hello, our patient Tyler Mckeon has had Pap Smear (HPV) aka Cervical Cancer Screening completed/performed  Please assist in updating the patient chart by pulling the Care Everywhere (CE) document  The date of service is 05/18/2015       Thank you,  AIDA RODRIGUEZ [facility]

## 2020-08-31 NOTE — TELEPHONE ENCOUNTER
----- Message from Jessica Beasley sent at 8/28/2020  1:50 PM EDT -----  Regarding: HIV test  08/28/20 1:50 PM    Hello, our patient Waldo Jones has had HIV completed/performed  Please assist in updating the patient chart by pulling the Care Everywhere (CE) document  The date of service is 05/14/2015       Thank you,  AIDA RODRIGUEZ  [facility]

## 2020-08-31 NOTE — TELEPHONE ENCOUNTER
Upon review of the In Basket request we were able to locate, review, and update the patient chart as requested for HIV  Any additional questions or concerns should be emailed to the Practice Liaisons via [email or web address] email, please do not reply via In Basket      Thank you  Lizbeth Vann

## 2020-11-06 ENCOUNTER — TELEPHONE (OUTPATIENT)
Dept: FAMILY MEDICINE CLINIC | Facility: CLINIC | Age: 35
End: 2020-11-06

## 2020-11-06 DIAGNOSIS — Z00.00 HEALTH CARE MAINTENANCE: ICD-10-CM

## 2020-11-06 DIAGNOSIS — Z13.220 SCREENING FOR HYPERLIPIDEMIA: ICD-10-CM

## 2020-11-06 DIAGNOSIS — E55.9 VITAMIN D DEFICIENCY: Primary | ICD-10-CM

## 2020-12-09 ENCOUNTER — OFFICE VISIT (OUTPATIENT)
Dept: FAMILY MEDICINE CLINIC | Facility: CLINIC | Age: 35
End: 2020-12-09
Payer: COMMERCIAL

## 2020-12-09 VITALS
BODY MASS INDEX: 25.43 KG/M2 | RESPIRATION RATE: 18 BRPM | DIASTOLIC BLOOD PRESSURE: 74 MMHG | SYSTOLIC BLOOD PRESSURE: 116 MMHG | WEIGHT: 162 LBS | HEIGHT: 67 IN | TEMPERATURE: 96.5 F | HEART RATE: 76 BPM | OXYGEN SATURATION: 96 %

## 2020-12-09 DIAGNOSIS — Z00.00 HEALTH CARE MAINTENANCE: ICD-10-CM

## 2020-12-09 DIAGNOSIS — E55.9 VITAMIN D DEFICIENCY: ICD-10-CM

## 2020-12-09 DIAGNOSIS — L30.9 ECZEMA, UNSPECIFIED TYPE: Primary | ICD-10-CM

## 2020-12-09 PROCEDURE — 1036F TOBACCO NON-USER: CPT | Performed by: FAMILY MEDICINE

## 2020-12-09 PROCEDURE — 99395 PREV VISIT EST AGE 18-39: CPT | Performed by: FAMILY MEDICINE

## 2020-12-09 PROCEDURE — 3008F BODY MASS INDEX DOCD: CPT | Performed by: FAMILY MEDICINE

## 2020-12-09 RX ORDER — MOMETASONE FUROATE 1 MG/G
CREAM TOPICAL AS NEEDED
Qty: 45 G | Refills: 0 | Status: SHIPPED | OUTPATIENT
Start: 2020-12-09

## 2020-12-30 DIAGNOSIS — Z20.822 EXPOSURE TO COVID-19 VIRUS: ICD-10-CM

## 2020-12-30 DIAGNOSIS — Z20.822 EXPOSURE TO COVID-19 VIRUS: Primary | ICD-10-CM

## 2020-12-30 PROCEDURE — U0003 INFECTIOUS AGENT DETECTION BY NUCLEIC ACID (DNA OR RNA); SEVERE ACUTE RESPIRATORY SYNDROME CORONAVIRUS 2 (SARS-COV-2) (CORONAVIRUS DISEASE [COVID-19]), AMPLIFIED PROBE TECHNIQUE, MAKING USE OF HIGH THROUGHPUT TECHNOLOGIES AS DESCRIBED BY CMS-2020-01-R: HCPCS | Performed by: FAMILY MEDICINE

## 2020-12-31 LAB — SARS-COV-2 RNA SPEC QL NAA+PROBE: NOT DETECTED

## 2021-01-05 DIAGNOSIS — Z20.822 CLOSE EXPOSURE TO COVID-19 VIRUS: Primary | ICD-10-CM

## 2021-01-05 DIAGNOSIS — Z20.822 CLOSE EXPOSURE TO COVID-19 VIRUS: ICD-10-CM

## 2021-01-05 PROCEDURE — U0003 INFECTIOUS AGENT DETECTION BY NUCLEIC ACID (DNA OR RNA); SEVERE ACUTE RESPIRATORY SYNDROME CORONAVIRUS 2 (SARS-COV-2) (CORONAVIRUS DISEASE [COVID-19]), AMPLIFIED PROBE TECHNIQUE, MAKING USE OF HIGH THROUGHPUT TECHNOLOGIES AS DESCRIBED BY CMS-2020-01-R: HCPCS | Performed by: FAMILY MEDICINE

## 2021-01-06 ENCOUNTER — TELEPHONE (OUTPATIENT)
Dept: FAMILY MEDICINE CLINIC | Facility: CLINIC | Age: 36
End: 2021-01-06

## 2021-01-06 LAB — SARS-COV-2 RNA SPEC QL NAA+PROBE: DETECTED

## 2021-01-06 NOTE — TELEPHONE ENCOUNTER
Spoke to patient  Patient is aware to social distance and mask within the home, frequent handwashing, cleaning of shared surfaces

## 2021-01-06 NOTE — TELEPHONE ENCOUNTER
Andrey WhitleyMalissa, returned the office's call she is aware of her positive COVID-19 test results  Virtual scheduled for tomorrow  Further clarification is needed  Andrey Leonardo and her  are both positive as well as their daughter  Pt's son is symptomatic so his pediatrician whom thy have been in contact with feels he is assumed to be positive  They have young children  Should they continue to  social distance in home wear masks? Stay in a specific room? Please clarify       Pt's number is [phone]

## 2021-01-07 ENCOUNTER — TELEMEDICINE (OUTPATIENT)
Dept: FAMILY MEDICINE CLINIC | Facility: CLINIC | Age: 36
End: 2021-01-07
Payer: COMMERCIAL

## 2021-01-07 DIAGNOSIS — U07.1 COVID-19: Primary | ICD-10-CM

## 2021-01-07 DIAGNOSIS — R09.81 NASAL CONGESTION: ICD-10-CM

## 2021-01-07 PROCEDURE — 99213 OFFICE O/P EST LOW 20 MIN: CPT | Performed by: FAMILY MEDICINE

## 2021-01-07 NOTE — PATIENT INSTRUCTIONS
DOJ:[phone]  covid 19 positive, follow CDC guidelines for covid 19 and self isolate as directed and call if fever or sob/resp distress  Take Vitamin D and c and zinc and call if fever or resp distress and sob  Call if any problems  Getting more energy

## 2021-01-07 NOTE — PROGRESS NOTES
COVID-19 Virtual Visit     Assessment/Plan:    Problem List Items Addressed This Visit     None      Visit Diagnoses     COVID-19    -  Primary    Nasal congestion             Disposition:     I recommended continued isolation until at least 24 hours have passed since recovery defined as resolution of fever without the use of fever-reducing medications AND improvement in COVID symptoms AND 10 days have passed since onset of symptoms (or 10 days have passed since date of first positive viral diagnostic test for asymptomatic patients)  I have spent 15 minutes directly with the patient  Greater than 50% of this time was spent in counseling/coordination of care regarding: diagnostic results, prognosis, risks and benefits of treatment options, instructions for management, patient and family education, importance of treatment compliance, risk factor reductions and impressions  Encounter provider Jenifer Stanley DO    Provider located at [address] [place] [phone]    Recent Visits  Date Type Provider Dept   01/06/21 Telephone [facility] [address] [facility] recent visits within past 7 days and meeting all other requirements     Today's Visits  Date Type Provider Dept   01/07/21 Telemedicine Jenifer Stanley DO [facility] [facility] today's visits and meeting all other requirements     Future Appointments  No visits were found meeting these conditions  Showing future appointments within next 150 days and meeting all other requirements      This virtual check-in was done via [employer] and patient was informed that this is a secure, HIPAA-compliant platform  She agrees to proceed  Patient agrees to participate in a virtual check in via telephone or video visit instead of presenting to the office to address urgent/immediate medical needs  Patient is aware this is a billable service      After connecting through Tele[employer], the patient was identified by name and date of birth  Jono Caceres was informed that this was a telemedicine visit and that the exam was being conducted confidentially over secure lines  My office door was closed  No one else was in the room  Jono Caceres acknowledged consent and understanding of privacy and security of the telemedicine visit  I informed the patient that I have reviewed her record in Epic and presented the opportunity for her to ask any questions regarding the visit today  The patient agreed to participate  Subjective:   Jono Caceres is a 38 y o  female who has been screened for COVID-19  Patient denies fever, cough and shortness of breath  Rody Woods has been staying home and has isolated themselves in her home  She is taking care to not share personal items and is cleaning all surfaces that are touched often, like counters, tabletops, and doorknobs using household cleaning sprays or wipes  She is wearing a mask when she leaves her room  Date of symptom onset: 1/5/2020  Date of positive COVID-19 PCR: 1/5/2021    Lab Results   Component Value Date    SARSCOV2 Detected (A) 01/05/2021     No past medical history on file    Past Surgical History:   Procedure Laterality Date    BREAST EXCISIONAL BIOPSY Left 2004    fibroadenoma  neg    BREAST SURGERY       Current Outpatient Medications   Medication Sig Dispense Refill    dicyclomine (BENTYL) 20 mg tablet Take 1 tablet (20 mg total) by mouth every 6 (six) hours (Patient not taking: Reported on 1/9/2019 ) 30 tablet 0    fluticasone (FLONASE) 50 mcg/act nasal spray 2 sprays into each nostril daily Prn sinusitis (Patient not taking: Reported on 8/28/2020) 1 Bottle 0    fluticasone (FLONASE) 50 mcg/act nasal spray 2 sprays into each nostril daily Prn sinusitis 1 Bottle 1    mometasone (ELOCON) 0 1 % cream Apply topically as needed (Dry skin  and breakouts) 45 g 0    multivitamin (THERAGRAN) TABS Take 1 tablet by mouth daily      Nutritional Supplements (FRUIT & VEGETABLE DAILY) CAPS Take by mouth      Omega-3 Fatty Acids (OMEGA 3 PO) Take by mouth daily      predniSONE 10 mg tablet Take 60 mg po day#1, 50 mg po day#2, 40 mg po day#3, 30 mg po day#4, 20 mg po day#5m and 10 mg po day#6 (Patient not taking: Reported on 12/9/2020) 21 tablet 0    Probiotic Product (PROBIOTIC DAILY PO) Take by mouth       No current facility-administered medications for this visit  Allergies   Allergen Reactions    Polymyxin B-Trimethoprim     Sulfa Antibiotics Other (See Comments)       Review of Systems   Constitutional: Negative  Negative for fever  HENT: Negative  Eyes: Negative  Respiratory: Negative  Negative for cough and shortness of breath  Cardiovascular: Negative  Gastrointestinal: Negative  Endocrine: Negative  Genitourinary: Negative  Musculoskeletal: Negative  Skin: Negative  Allergic/Immunologic: Negative  Neurological: Negative  Hematological: Negative  Psychiatric/Behavioral: Negative  Objective: There were no vitals filed for this visit  Physical Exam  Constitutional:       Appearance: Normal appearance  HENT:      Head: Normocephalic and atraumatic  Nose: Congestion present  Eyes:      Conjunctiva/sclera: Conjunctivae normal    Pulmonary:      Effort: Pulmonary effort is normal  No respiratory distress  Skin:     Coloration: Skin is not pale  Neurological:      General: No focal deficit present  Mental Status: She is alert and oriented to person, place, and time  Psychiatric:         Mood and Affect: Mood normal          Behavior: Behavior normal          Thought Content: Thought content normal          Judgment: Judgment normal        Patient Instructions   BCY:[phone]  covid 19 positive, follow CDC guidelines for covid 19 and self isolate as directed and call if fever or sob/resp distress   Take Vitamin D and c and zinc and call if fever or resp distress and sob  Call if any problems  Getting more energy  VIRTUAL VISIT DISCLAIMER    Dmitri Edwards acknowledges that she has consented to an online visit or consultation  She understands that the online visit is based solely on information provided by her, and that, in the absence of a face-to-face physical evaluation by the physician, the diagnosis she receives is both limited and provisional in terms of accuracy and completeness  This is not intended to replace a full medical face-to-face evaluation by the physician  Dmitri Duque understands and accepts these terms

## 2021-01-12 ENCOUNTER — TELEMEDICINE (OUTPATIENT)
Dept: FAMILY MEDICINE CLINIC | Facility: CLINIC | Age: 36
End: 2021-01-12
Payer: COMMERCIAL

## 2021-01-12 DIAGNOSIS — U07.1 COVID-19: Primary | ICD-10-CM

## 2021-01-12 PROCEDURE — 99213 OFFICE O/P EST LOW 20 MIN: CPT | Performed by: FAMILY MEDICINE

## 2021-01-12 NOTE — PATIENT INSTRUCTIONS
AAR:[phone]  covid 19 re check, take vitamin D and c and zinc and call if fever or sob/resp distress  Patient is clear from [place] 19 quarantine  Call if any fever or sob/resp distress  Doing well and has no fever or cough/resp distress and has been isolated as directed with symptoms starting 1/1/21

## 2021-01-12 NOTE — PROGRESS NOTES
COVID-19 Virtual Visit     Assessment/Plan:    Problem List Items Addressed This Visit     None      Visit Diagnoses     COVID-19    -  Primary         Disposition:     I recommended continued isolation until at least 24 hours have passed since recovery defined as resolution of fever without the use of fever-reducing medications AND improvement in COVID symptoms AND 10 days have passed since onset of symptoms (or 10 days have passed since date of first positive viral diagnostic test for asymptomatic patients)  I have spent 15 minutes directly with the patient  Greater than 50% of this time was spent in counseling/coordination of care regarding: diagnostic results, prognosis, risks and benefits of treatment options, instructions for management, patient and family education, importance of treatment compliance, risk factor reductions and impressions  Encounter provider Bailee Mckay DO    Provider located at [address] [place] [id number]    Recent Visits  Date Type Provider Dept   01/07/21 Telemedicine Bailee Mckay, [address] [place] [address]   01/06/21 Telephone [address] [facility] recent visits within past 7 days and meeting all other requirements     Today's Visits  Date Type Provider Dept   01/12/21 Telemedicine Bailee Mckay DO [address] [facility] today's visits and meeting all other requirements     Future Appointments  No visits were found meeting these conditions  Showing future appointments within next 150 days and meeting all other requirements      This virtual check-in was done via [employer] and patient was informed that this is a secure, HIPAA-compliant platform  She agrees to proceed  Patient agrees to participate in a virtual check in via telephone or video visit instead of presenting to the office to address urgent/immediate medical needs   Patient is aware this is a billable service  After connecting through [facility], the patient was identified by name and date of birth  Rei Avila was informed that this was a telemedicine visit and that the exam was being conducted confidentially over secure lines  My office door was closed  No one else was in the room  Rei Avila acknowledged consent and understanding of privacy and security of the telemedicine visit  I informed the patient that I have reviewed her record in Epic and presented the opportunity for her to ask any questions regarding the visit today  The patient agreed to participate  Subjective:   Rei Avila is a 38 y o  female who has been screened for COVID-19  Patient denies fever, cough and shortness of breath  Date of symptom onset: 1/1/2020  Date of positive COVID-19 PCR: 1/5/2021    Lab Results   Component Value Date    SARSCOV2 Detected (A) 01/05/2021     No past medical history on file    Past Surgical History:   Procedure Laterality Date    BREAST EXCISIONAL BIOPSY Left 2004    fibroadenoma  neg    BREAST SURGERY       Current Outpatient Medications   Medication Sig Dispense Refill    dicyclomine (BENTYL) 20 mg tablet Take 1 tablet (20 mg total) by mouth every 6 (six) hours (Patient not taking: Reported on 1/9/2019 ) 30 tablet 0    fluticasone (FLONASE) 50 mcg/act nasal spray 2 sprays into each nostril daily Prn sinusitis (Patient not taking: Reported on 8/28/2020) 1 Bottle 0    fluticasone (FLONASE) 50 mcg/act nasal spray 2 sprays into each nostril daily Prn sinusitis 1 Bottle 1    mometasone (ELOCON) 0 1 % cream Apply topically as needed (Dry skin  and breakouts) 45 g 0    multivitamin (THERAGRAN) TABS Take 1 tablet by mouth daily      Nutritional Supplements (FRUIT & VEGETABLE DAILY) CAPS Take by mouth      Omega-3 Fatty Acids (OMEGA 3 PO) Take by mouth daily      predniSONE 10 mg tablet Take 60 mg po day#1, 50 mg po day#2, 40 mg po day#3, 30 mg po day#4, 20 mg po day#5m and 10 mg po day#6 (Patient not taking: Reported on 12/9/2020) 21 tablet 0    Probiotic Product (PROBIOTIC DAILY PO) Take by mouth       No current facility-administered medications for this visit  Allergies   Allergen Reactions    Polymyxin B-Trimethoprim     Sulfa Antibiotics Other (See Comments)       Review of Systems   Constitutional: Negative  Negative for fever  HENT: Negative  Eyes: Negative  Respiratory: Negative  Negative for cough and shortness of breath  Cardiovascular: Negative  Gastrointestinal: Negative  Endocrine: Negative  Genitourinary: Negative  Musculoskeletal: Negative  Skin: Negative  Allergic/Immunologic: Negative  Neurological: Negative  Hematological: Negative  Psychiatric/Behavioral: Negative  Objective: There were no vitals filed for this visit  Physical Exam  Constitutional:       Appearance: Normal appearance  HENT:      Head: Normocephalic and atraumatic  Eyes:      Conjunctiva/sclera: Conjunctivae normal    Pulmonary:      Effort: Pulmonary effort is normal  No respiratory distress  Skin:     Coloration: Skin is not pale  Neurological:      General: No focal deficit present  Mental Status: She is alert and oriented to person, place, and time  Psychiatric:         Mood and Affect: Mood normal          Behavior: Behavior normal          Thought Content: Thought content normal          Judgment: Judgment normal        Patient Instructions   IZE:[phone]  covid 19 re check, take vitamin D and c and zinc and call if fever or sob/resp distress  Patient is clear from [place] [address] quarantine  Call if any fever or sob/resp distress  Doing well and has no fever or cough/resp distress and has been isolated as directed with symptoms starting 1/1/21  VIRTUAL VISIT DISCLAIMER    Ioana Mann acknowledges that she has consented to an online visit or consultation   She understands that the online visit is based solely on information provided by her, and that, in the absence of a face-to-face physical evaluation by the physician, the diagnosis she receives is both limited and provisional in terms of accuracy and completeness  This is not intended to replace a full medical face-to-face evaluation by the physician  Esther Tejada understands and accepts these terms

## 2021-09-16 ENCOUNTER — TELEMEDICINE (OUTPATIENT)
Dept: FAMILY MEDICINE CLINIC | Facility: CLINIC | Age: 36
End: 2021-09-16
Payer: COMMERCIAL

## 2021-09-16 DIAGNOSIS — Z71.85 VACCINE COUNSELING: Primary | ICD-10-CM

## 2021-09-16 PROCEDURE — 99213 OFFICE O/P EST LOW 20 MIN: CPT | Performed by: FAMILY MEDICINE

## 2021-09-16 NOTE — PROGRESS NOTES
Virtual Regular Visit    Verification of patient location:    Patient is located in the following state in which I hold an active license [place]      Assessment/Plan:    Problem List Items Addressed This Visit     None      Visit Diagnoses     Vaccine counseling    -  Primary               Reason for visit is   Chief Complaint   Patient presents with    Virtual Regular Visit        Encounter provider Roddy Baker DO    Provider located at [address] [place] [address] [address] [facility] [address] [phone]      Recent Visits  No visits were found meeting these conditions  Showing recent visits within past 7 days and meeting all other requirements  Today's Visits  Date Type Provider Dept   09/16/21 Telemedicine Roscoe Escobar [facility] [address] [facility]   Showing today's visits and meeting all other requirements  Future Appointments  No visits were found meeting these conditions  Showing future appointments within next 150 days and meeting all other requirements       The patient was identified by name and date of birth  Gordo Gonzales was informed that this is a telemedicine visit and that the visit is being conducted through telephone  My office door was closed  No one else was in the room  She acknowledged consent and understanding of privacy and security of the video platform  The patient has agreed to participate and understands they can discontinue the visit at any time  Patient is aware this is a billable service  Subjective  Gordo Gonzales is a 43 y o  female Discuss the covid vaccination due to her employer mandating it [employer] [phone]   Discuss the covid vaccination due to her employer mandating it [employer] [phone]  Discussed with patient re covid and vaccines and science behind the vaccine  No past medical history on file      Past Surgical History:   Procedure Laterality Date    BREAST EXCISIONAL BIOPSY Left 2004    fibroadenoma  neg    BREAST SURGERY         Current Outpatient Medications   Medication Sig Dispense Refill    dicyclomine (BENTYL) 20 mg tablet Take 1 tablet (20 mg total) by mouth every 6 (six) hours (Patient not taking: Reported on 1/9/2019 ) 30 tablet 0    fluticasone (FLONASE) 50 mcg/act nasal spray 2 sprays into each nostril daily Prn sinusitis (Patient not taking: Reported on 8/28/2020) 1 Bottle 0    fluticasone (FLONASE) 50 mcg/act nasal spray 2 sprays into each nostril daily Prn sinusitis 1 Bottle 1    mometasone (ELOCON) 0 1 % cream Apply topically as needed (Dry skin  and breakouts) 45 g 0    multivitamin (THERAGRAN) TABS Take 1 tablet by mouth daily      Nutritional Supplements (FRUIT & VEGETABLE DAILY) CAPS Take by mouth      Omega-3 Fatty Acids (OMEGA 3 PO) Take by mouth daily      predniSONE 10 mg tablet Take 60 mg po day#1, 50 mg po day#2, 40 mg po day#3, 30 mg po day#4, 20 mg po day#5m and 10 mg po day#6 (Patient not taking: Reported on 12/9/2020) 21 tablet 0    Probiotic Product (PROBIOTIC DAILY PO) Take by mouth       No current facility-administered medications for this visit  Allergies   Allergen Reactions    Polymyxin B-Trimethoprim     Sulfa Antibiotics Other (See Comments)       Review of Systems   Constitutional: Negative  HENT: Negative  Eyes: Negative  Respiratory: Negative  Cardiovascular: Negative  Gastrointestinal: Negative  Endocrine: Negative  Genitourinary: Negative  Musculoskeletal: Negative  Skin: Negative  Allergic/Immunologic: Negative  Neurological: Negative  Hematological: Negative  Psychiatric/Behavioral: Negative  Video Exam    There were no vitals filed for this visit  Physical Exam  Constitutional:       Appearance: She is well-developed  Pulmonary:      Effort: Pulmonary effort is normal  No respiratory distress  Skin:     Coloration: Skin is not pale     Neurological:      General: No focal deficit present  Mental Status: She is alert and oriented to person, place, and time  Deep Tendon Reflexes: Reflexes are normal and symmetric  Psychiatric:         Mood and Affect: Mood normal          Behavior: Behavior normal          Thought Content: Thought content normal          Judgment: Judgment normal           I spent 15 minutes directly with the patient during this visit     Patient Instructions   Discuss the covid vaccination due to her employer mandating it [employer] [phone]  Works from home and did have covid 19 in past  Patient given information re covid vaccines and covid in general and will make decision re getting covid 19 vaccine or not  VIRTUAL VISIT DISCLAIMER      Deepika Kiran verbally agrees to participate in [employer]  Pt is aware that [employer] could be limited without vital signs or the ability to perform a full hands-on physical Benito Erickson understands she or the provider may request at any time to terminate the video visit and request the patient to seek care or treatment in person

## 2021-09-16 NOTE — PATIENT INSTRUCTIONS
Discuss the covid vaccination due to her employer mandating it [employer] [phone]  Works from home and did have covid 19 in past  Patient given information re covid vaccines and covid in general and will make decision re getting covid 19 vaccine or not

## 2022-01-05 ENCOUNTER — TELEMEDICINE (OUTPATIENT)
Dept: FAMILY MEDICINE CLINIC | Facility: CLINIC | Age: 37
End: 2022-01-05
Payer: COMMERCIAL

## 2022-01-05 DIAGNOSIS — J32.9 SINUSITIS, UNSPECIFIED CHRONICITY, UNSPECIFIED LOCATION: Primary | ICD-10-CM

## 2022-01-05 DIAGNOSIS — R05.9 COUGH: ICD-10-CM

## 2022-01-05 PROCEDURE — 99213 OFFICE O/P EST LOW 20 MIN: CPT | Performed by: FAMILY MEDICINE

## 2022-01-05 RX ORDER — AZITHROMYCIN 500 MG/1
500 TABLET, FILM COATED ORAL DAILY
Qty: 3 TABLET | Refills: 0 | Status: SHIPPED | OUTPATIENT
Start: 2022-01-05 | End: 2022-01-08

## 2022-01-05 RX ORDER — PREDNISONE 10 MG/1
TABLET ORAL
Qty: 21 TABLET | Refills: 0 | Status: SHIPPED | OUTPATIENT
Start: 2022-01-05 | End: 2022-03-15

## 2022-01-05 NOTE — PATIENT INSTRUCTIONS
[facility] [phone]  Sinus infection very painful  Started Mechelle Marsha  Kids were tested for covid and was negative  Son with ear infection and cough resembles a virus/chest cold  Son and daughter on abx and helped her children  Last Tuesday  and patient were sick and patient had bronchial cough and horse for 2 days and no known exposure to COVID 19  Took Zyrtec D and got better and now worse  Start abx as discharge is green and has pain ful sinuses  Start prednisone as directed and flonase prn  Call if worse and stay well hydrated

## 2022-01-05 NOTE — PROGRESS NOTES
It was my intent to perform this visit via video technology but the patient was not able to do a video connection so the visit was completed via audio telephone only  Virtual Regular Visit    Verification of patient location:    Patient is located in the following state in which I hold an active license [place]      Assessment/Plan:    Problem List Items Addressed This Visit     None      Visit Diagnoses     Sinusitis, unspecified chronicity, unspecified location    -  Primary    Relevant Medications    predniSONE 10 mg tablet    azithromycin (ZITHROMAX) 500 MG tablet    Cough        Relevant Medications    predniSONE 10 mg tablet    azithromycin (ZITHROMAX) 500 MG tablet               Reason for visit is   Chief Complaint   Patient presents with    Virtual Regular Visit        Encounter provider Grady Duncan DO    Provider located at [address] [place] [address] [address]  [facility] [address]  [facility] [address] [phone]      Recent Visits  No visits were found meeting these conditions  Showing recent visits within past 7 days and meeting all other requirements  Today's Visits  Date Type Provider Dept   01/05/22 Telemedicine Grady Duncna, [address] [facility]   Showing today's visits and meeting all other requirements  Future Appointments  No visits were found meeting these conditions  Showing future appointments within next 150 days and meeting all other requirements       The patient was identified by name and date of birth  Waldo Jones was informed that this is a telemedicine visit and that the visit is being conducted through Telephone  My office door was closed  No one else was in the room  She acknowledged consent and understanding of privacy and security of the video platform  The patient has agreed to participate and understands they can discontinue the visit at any time  Patient is aware this is a billable service       [facility] Ruddy Rust is a 43 y o  female [facility] [phone]  Sinus infection very painful   [facility] - [phone]  Sinus infection very painful  No past medical history on file  Past Surgical History:   Procedure Laterality Date    BREAST EXCISIONAL BIOPSY Left 2004    fibroadenoma  neg    BREAST SURGERY         Current Outpatient Medications   Medication Sig Dispense Refill    azithromycin (ZITHROMAX) 500 MG tablet Take 1 tablet (500 mg total) by mouth daily for 3 days 3 tablet 0    dicyclomine (BENTYL) 20 mg tablet Take 1 tablet (20 mg total) by mouth every 6 (six) hours (Patient not taking: Reported on 1/9/2019 ) 30 tablet 0    fluticasone (FLONASE) 50 mcg/act nasal spray 2 sprays into each nostril daily Prn sinusitis (Patient not taking: Reported on 8/28/2020) 1 Bottle 0    fluticasone (FLONASE) 50 mcg/act nasal spray 2 sprays into each nostril daily Prn sinusitis 1 Bottle 1    mometasone (ELOCON) 0 1 % cream Apply topically as needed (Dry skin  and breakouts) 45 g 0    multivitamin (THERAGRAN) TABS Take 1 tablet by mouth daily      Nutritional Supplements (FRUIT & VEGETABLE DAILY) CAPS Take by mouth      Omega-3 Fatty Acids (OMEGA 3 PO) Take by mouth daily      predniSONE 10 mg tablet Take 60 mg po day#1, 50 mg po day#2, 40 mg po day#3, 30 mg po day#4, 20 mg po day#5m and 10 mg po day#6 (Patient not taking: Reported on 12/9/2020) 21 tablet 0    predniSONE 10 mg tablet Take 60 mg po day#1, 50 mg po day#2, 40 mg po day#3, 30 mg po day#4, 20 mg po day#5m and 10 mg po day#6 21 tablet 0    Probiotic Product (PROBIOTIC DAILY PO) Take by mouth       No current facility-administered medications for this visit  Allergies   Allergen Reactions    Polymyxin B-Trimethoprim     Sulfa Antibiotics Other (See Comments)       Review of Systems   Constitutional: Negative  Negative for fever  HENT: Positive for sinus pressure and sinus pain  Eyes: Negative  Respiratory: Negative    Negative for cough and shortness of breath  Cardiovascular: Negative  Gastrointestinal: Negative  Endocrine: Negative  Genitourinary: Negative  Musculoskeletal: Negative  Skin: Negative  Allergic/Immunologic: Negative  Neurological: Negative  Hematological: Negative  Psychiatric/Behavioral: Negative  Video Exam    There were no vitals filed for this visit  Physical Exam  Pulmonary:      Effort: Pulmonary effort is normal  No respiratory distress  Neurological:      General: No focal deficit present  Mental Status: She is alert and oriented to person, place, and time  Psychiatric:         Mood and Affect: Mood normal          Behavior: Behavior normal          Thought Content: Thought content normal          Judgment: Judgment normal           I spent 15 minutes directly with the patient during this visit     Patient Instructions   [facility] - [phone]  Sinus infection very painful  Started Keatchie Marsha  Kids were tested for covid and was negative  Son with ear infection and cough resembles a virus/chest cold  Son and daughter on abx and helped her children  Last Tuesday  and patient were sick and patient had bronchial cough and horse for 2 days and no known exposure to COVID 19  Took Zyrtec D and got better and now worse  Start abx as discharge is green and has pain ful sinuses  Start prednisone as directed and flonase prn  Call if worse and stay well hydrated  VIRTUAL VISIT DISCLAIMER      Latasha Santos verbally agrees to participate in [employer]  Pt is aware that [employer] could be limited without vital signs or the ability to perform a full hands-on physical Faizan Muro understands she or the provider may request at any time to terminate the video visit and request the patient to seek care or treatment in person

## 2022-01-19 ENCOUNTER — TELEPHONE (OUTPATIENT)
Dept: FAMILY MEDICINE CLINIC | Facility: CLINIC | Age: 37
End: 2022-01-19

## 2022-01-19 DIAGNOSIS — Z00.00 HEALTH CARE MAINTENANCE: ICD-10-CM

## 2022-01-19 DIAGNOSIS — Z13.220 SCREENING FOR HYPERLIPIDEMIA: ICD-10-CM

## 2022-01-19 DIAGNOSIS — E55.9 VITAMIN D DEFICIENCY: Primary | ICD-10-CM

## 2022-03-15 ENCOUNTER — OFFICE VISIT (OUTPATIENT)
Dept: FAMILY MEDICINE CLINIC | Facility: CLINIC | Age: 37
End: 2022-03-15
Payer: COMMERCIAL

## 2022-03-15 VITALS
HEART RATE: 90 BPM | DIASTOLIC BLOOD PRESSURE: 80 MMHG | SYSTOLIC BLOOD PRESSURE: 118 MMHG | TEMPERATURE: 97.8 F | BODY MASS INDEX: 26.37 KG/M2 | HEIGHT: 67 IN | OXYGEN SATURATION: 98 % | RESPIRATION RATE: 16 BRPM | WEIGHT: 168 LBS

## 2022-03-15 DIAGNOSIS — Z00.00 HEALTH CARE MAINTENANCE: Primary | ICD-10-CM

## 2022-03-15 DIAGNOSIS — M25.562 LEFT KNEE PAIN, UNSPECIFIED CHRONICITY: ICD-10-CM

## 2022-03-15 DIAGNOSIS — E66.3 OVERWEIGHT (BMI 25.0-29.9): ICD-10-CM

## 2022-03-15 PROCEDURE — 1036F TOBACCO NON-USER: CPT | Performed by: FAMILY MEDICINE

## 2022-03-15 PROCEDURE — 3008F BODY MASS INDEX DOCD: CPT | Performed by: FAMILY MEDICINE

## 2022-03-15 PROCEDURE — 3725F SCREEN DEPRESSION PERFORMED: CPT | Performed by: FAMILY MEDICINE

## 2022-03-15 PROCEDURE — 99395 PREV VISIT EST AGE 18-39: CPT | Performed by: FAMILY MEDICINE

## 2022-03-15 NOTE — PROGRESS NOTES
Assessment/Plan:   Chief Complaint   Patient presents with    Physical Exam     Patient Instructions   Here for left knee pain chronic and needs left knee xray and consider Ortho consult if not better  Pain with lunging left knee  Rest left knee if in pain  Use sunscreen and monitor for ticks this summer  F-up with GYN as directed for routine cervical cancer screening  Patient to do SBE as directed  Labs reviewed and wnl  No problem-specific Assessment & Plan notes found for this encounter  Diagnoses and all orders for this visit:    Health care maintenance    Left knee pain, unspecified chronicity  -     XR knee 4+ vw left injury; Future    Overweight (BMI 25 0-29  9)          Subjective:      Patient ID: Genny Willis is a 43 y o  female  Physical Exam  Sees GYN  Does diet and exercise  Had a mammogram in past for a left breast cyst  Here with   Does [employer] 3 times per week  Patient works at [employer] in [facility] [employer]  Sleeps 8 hours per night  Labs stable  The following portions of the patient's history were reviewed and updated as appropriate: allergies, current medications, past family history, past medical history, past social history, past surgical history and problem list     Review of Systems   Constitutional: Negative  HENT: Negative  Eyes: Negative  Respiratory: Negative  Cardiovascular: Negative  Gastrointestinal: Negative  Endocrine: Negative  Genitourinary: Negative  Musculoskeletal:        Left knee pain with going up steps   Skin: Negative  Allergic/Immunologic: Negative  Neurological: Negative  Hematological: Negative  Psychiatric/Behavioral: Negative            Objective:      /80 (BP Location: Left arm, Patient Position: Sitting, Cuff Size: Adult)   Pulse 90   Temp 97 8 °F (36 6 °C) (Temporal)   Resp 16   Ht 5' 7" (1 702 m)   Wt 76 2 kg (168 lb)   LMP 02/25/2022 (Exact Date)   SpO2 98%   BMI 26 31 kg/m² Physical Exam  Constitutional:       Appearance: She is well-developed  HENT:      Head: Normocephalic and atraumatic  Right Ear: External ear normal       Left Ear: External ear normal    Eyes:      Conjunctiva/sclera: Conjunctivae normal       Pupils: Pupils are equal, round, and reactive to light  Cardiovascular:      Rate and Rhythm: Normal rate and regular rhythm  Heart sounds: Normal heart sounds  Pulmonary:      Effort: Pulmonary effort is normal       Breath sounds: Normal breath sounds  Abdominal:      General: Abdomen is flat  Bowel sounds are normal       Palpations: Abdomen is soft  Musculoskeletal:         General: Normal range of motion  Cervical back: Normal range of motion and neck supple  Skin:     General: Skin is warm and dry  Neurological:      Mental Status: She is alert and oriented to person, place, and time  Deep Tendon Reflexes: Reflexes are normal and symmetric     Psychiatric:         Behavior: Behavior normal

## 2022-03-15 NOTE — PATIENT INSTRUCTIONS
Here for left knee pain chronic and needs left knee xray and consider Ortho consult if not better  Pain with lunging left knee  Rest left knee if in pain  Use sunscreen and monitor for ticks this summer  F-up with GYN as directed for routine cervical cancer screening  Patient to do SBE as directed  Labs reviewed and wnl

## 2023-01-27 DIAGNOSIS — J30.2 SEASONAL ALLERGIES: ICD-10-CM

## 2023-01-27 DIAGNOSIS — J32.9 SINUSITIS, UNSPECIFIED CHRONICITY, UNSPECIFIED LOCATION: ICD-10-CM

## 2023-01-27 RX ORDER — FLUTICASONE PROPIONATE 50 MCG
2 SPRAY, SUSPENSION (ML) NASAL DAILY
Qty: 16 G | Refills: 2 | Status: SHIPPED | OUTPATIENT
Start: 2023-01-27

## 2023-03-16 ENCOUNTER — TELEPHONE (OUTPATIENT)
Dept: FAMILY MEDICINE CLINIC | Facility: CLINIC | Age: 38
End: 2023-03-16

## 2023-03-16 NOTE — TELEPHONE ENCOUNTER
Patient called, she has been having Cold symptoms  for the last week  But the last day or 2 are sinuses have been swollen, teeth hurt, and her ears have been clogged  She is currently taking Zyrtec and Flonase when needed last 4/5 days  She asked if you have any rec  Or if she needs to be seen?

## 2023-05-19 ENCOUNTER — OFFICE VISIT (OUTPATIENT)
Dept: FAMILY MEDICINE CLINIC | Facility: CLINIC | Age: 38
End: 2023-05-19

## 2023-05-19 VITALS
HEIGHT: 67 IN | BODY MASS INDEX: 26.74 KG/M2 | WEIGHT: 170.38 LBS | SYSTOLIC BLOOD PRESSURE: 126 MMHG | DIASTOLIC BLOOD PRESSURE: 80 MMHG | TEMPERATURE: 98.3 F

## 2023-05-19 DIAGNOSIS — J01.40 ACUTE NON-RECURRENT PANSINUSITIS: Primary | ICD-10-CM

## 2023-05-19 PROBLEM — L40.9 PSORIASIS: Status: ACTIVE | Noted: 2017-07-17

## 2023-05-19 RX ORDER — PREDNISONE 10 MG/1
TABLET ORAL
Qty: 21 TABLET | Refills: 0 | Status: SHIPPED | OUTPATIENT
Start: 2023-05-19

## 2023-05-19 RX ORDER — MELATONIN
1000 DAILY
COMMUNITY

## 2023-05-19 RX ORDER — AZITHROMYCIN 250 MG/1
TABLET, FILM COATED ORAL
Qty: 6 TABLET | Refills: 0 | Status: SHIPPED | OUTPATIENT
Start: 2023-05-19 | End: 2023-05-23

## 2023-05-19 NOTE — ASSESSMENT & PLAN NOTE
Patient will continue with the zyrtec D  Patient to take the antibiotic as directed with the prednisone

## 2023-05-19 NOTE — PROGRESS NOTES
"Name: Coretta Doty      : 1985      MRN: [id number]  Encounter Provider: Lyric Sandhu DO  Encounter Date: 2023   Encounter department: [address] [place] [address] PRIMARY CARE    Assessment & Plan     Chief Complaint   Patient presents with   • Sinus Problem     \"Pain and pressure-green mucus-worse now-started about 2 weeks ago\"       1  Acute non-recurrent pansinusitis  Assessment & Plan:  Patient will continue with the zyrtec D  Patient to take the antibiotic as directed with the prednisone    Orders:  -     azithromycin (ZITHROMAX) 250 mg tablet; Take 2 tablets today then 1 tablet daily x 4 days  -     predniSONE 10 mg tablet; 6 tablet for 1 day then 5 tablets for 1 day then 4 tablets for 1 day then 3 tablets for 1 day then 2 tablets for 1 day and then 1 tablet daily for 1 day         Subjective      Patient has for last 2 weeks had sinus pain and pressure Patient also with thick green mucous She is taking zyrtec D daily Patient has no relief with this She has had issues seasonally in the past but not this bad She has no fever or chills Patient has left cheek  pain and bilateral frontal pain Patient has no sick contacts     URI   This is a new problem  Episode onset: 2 weeks  The problem has been gradually worsening  There has been no fever  Associated symptoms include congestion, headaches, rhinorrhea and sinus pain  Pertinent negatives include no abdominal pain, chest pain, coughing, diarrhea, dysuria, ear pain, joint pain, joint swelling, nausea, neck pain, plugged ear sensation, rash, sneezing, sore throat, swollen glands, vomiting or wheezing  Treatments tried: zyrtec D  The treatment provided no relief  Review of Systems   Constitutional: Negative for chills, fatigue and fever  HENT: Positive for congestion, postnasal drip, rhinorrhea, sinus pressure and sinus pain  Negative for ear pain, sneezing and sore throat  Respiratory: Negative for cough, shortness of breath and wheezing    " "  Cardiovascular: Negative for chest pain  Gastrointestinal: Negative for abdominal pain, diarrhea, nausea and vomiting  Genitourinary: Negative for dysuria  Musculoskeletal: Negative for joint pain and neck pain  Skin: Negative for rash  Neurological: Positive for headaches  Current Outpatient Medications on File Prior to Visit   Medication Sig   • cholecalciferol (VITAMIN D3) 1,000 units tablet Take 1,000 Units by mouth daily   • fluticasone (FLONASE) 50 mcg/act nasal spray 2 sprays into each nostril daily Prn sinusitis   • mometasone (ELOCON) 0 1 % cream Apply topically as needed (Dry skin  and breakouts)   • multivitamin (THERAGRAN) TABS Take 1 tablet by mouth daily   • Nutritional Supplements (FRUIT & VEGETABLE DAILY) CAPS Take by mouth   • Probiotic Product (PROBIOTIC DAILY PO) Take by mouth   • [DISCONTINUED] Omega-3 Fatty Acids (OMEGA 3 PO) Take by mouth daily       Objective     /80 (BP Location: Left arm, Patient Position: Sitting, Cuff Size: Standard)   Temp 98 3 °F (36 8 °C)   Ht 5' 7\" (1 702 m)   Wt 77 3 kg (170 lb 6 oz)   BMI 26 68 kg/m²     Physical Exam  Vitals and nursing note reviewed  Constitutional:       Appearance: Normal appearance  HENT:      Head: Normocephalic  Comments: Frontal sinus pain and also left maxillary sinus pain to palpation     Right Ear: Tympanic membrane and external ear normal       Left Ear: Tympanic membrane and external ear normal       Nose: Congestion and rhinorrhea present  Mouth/Throat:      Mouth: Mucous membranes are moist       Pharynx: No oropharyngeal exudate or posterior oropharyngeal erythema  Eyes:      Extraocular Movements: Extraocular movements intact  Conjunctiva/sclera: Conjunctivae normal       Pupils: Pupils are equal, round, and reactive to light  Cardiovascular:      Rate and Rhythm: Normal rate and regular rhythm  Heart sounds: Normal heart sounds     Pulmonary:      Effort: Pulmonary effort is " normal       Breath sounds: Normal breath sounds  Musculoskeletal:      Cervical back: Neck supple  Lymphadenopathy:      Cervical: No cervical adenopathy  Neurological:      General: No focal deficit present  Mental Status: She is alert and oriented to person, place, and time     Psychiatric:         Mood and Affect: Mood normal          Behavior: Behavior normal        Chuyita Gutierrez, DO

## 2023-05-19 NOTE — PATIENT INSTRUCTIONS
Sinusitis   WHAT YOU NEED TO KNOW:   What is sinusitis? Sinusitis is inflammation or infection of your sinuses  Sinusitis is most often caused by a virus  Acute sinusitis may last up to 12 weeks  Chronic sinusitis lasts longer than 12 weeks  Recurrent sinusitis means you have 4 or more infections in 1 year  What increases my risk for sinusitis? Medical conditions, such as an upper respiratory infection, allergies, asthma, or cystic fibrosis    Dental infections or procedures, such as gum infections, tooth decay, or a root canal    Smoking or exposure to secondhand smoke    Abnormal sinus structure, such as nasal growths, swollen tonsils, or a deviated septum    A weak immune system, from diseases such as diabetes or HIV    What are the signs and symptoms of sinusitis? Fever    Pain, pressure, redness, or swelling around the forehead, cheeks, or eyes    Thick yellow or green discharge from your nose    Tenderness when you touch your face over your sinuses    Dry cough that happens mostly at night or when you lie down    Headache and face pain that is worse when you lean forward    Tooth pain, or pain when you chew    How is sinusitis diagnosed? Your healthcare provider will examine you and ask about your symptoms  He or she may check inside your nose using a nasal speculum  You may need any of the following tests:  A sample of mucus from your nose  may show what germ is causing your infection  A CT or MRI of your head  may show the mucous lining of your sinuses  You may be given contrast liquid to help your sinuses show up better in the pictures  Tell your healthcare provider if you have ever had an allergic reaction to contrast liquid  Do not enter the MRI room with anything metal  Metal can cause serious injury  Tell your healthcare provider if you have any metal in or on your body  How is sinusitis treated? Your symptoms may go away on their own   Your healthcare provider may recommend watchful waiting for up to 10 days before starting antibiotics  You may need any of the following:  Acetaminophen  decreases pain and fever  It is available without a doctor's order  Ask how much to take and how often to take it  Follow directions  Read the labels of all other medicines you are using to see if they also contain acetaminophen, or ask your doctor or pharmacist  Acetaminophen can cause liver damage if not taken correctly  NSAIDs , such as ibuprofen, help decrease swelling, pain, and fever  This medicine is available with or without a doctor's order  NSAIDs can cause stomach bleeding or kidney problems in certain people  If you take blood thinner medicine, always ask your healthcare provider if NSAIDs are safe for you  Always read the medicine label and follow directions  Nasal steroid sprays  may help decrease inflammation in your nose and sinuses  Decongestants  help reduce swelling and drain mucus in the nose and sinuses  They may help you breathe easier  Antihistamines  help dry mucus in the nose and relieve sneezing  Antibiotics  help treat or prevent a bacterial infection  How can I manage my symptoms? Rinse your sinuses as directed  Use a sinus rinse device to rinse your nasal passages with a saline (salt water) solution or distilled water  Do not use tap water  This will help thin the mucus in your nose and rinse away pollen and dirt  It will also help reduce swelling so you can breathe normally  Use a humidifier  to increase air moisture in your home  This may make it easier for you to breathe and help decrease your cough  Sleep with your head elevated  Place an extra pillow under your head before you go to sleep to help your sinuses drain  Drink liquids as directed  Ask your healthcare provider how much liquid to drink each day and which liquids are best for you  Liquids will thin the mucus in your nose and help it drain   Avoid drinks that contain alcohol or caffeine  Do not smoke, and avoid secondhand smoke  Nicotine and other chemicals in cigarettes and cigars can make your symptoms worse  Ask your healthcare provider for information if you currently smoke and need help to quit  E-cigarettes or smokeless tobacco still contain nicotine  Talk to your healthcare provider before you use these products  How can I help prevent the spread of germs? Wash your hands often with soap and water  Wash your hands after you use the bathroom, change a child's diaper, or sneeze  Wash your hands before you prepare or eat food  Stay away from people who are sick  Some germs spread easily and quickly through contact  When should I seek immediate care? You have trouble breathing or wheezing that is getting worse  You have a stiff neck, a fever, or a bad headache  You cannot open your eye  Your eyeball bulges out or you cannot move your eye  You are more sleepy than normal, or you notice changes in your ability to think, move, or talk  You have swelling of your forehead or scalp  When should I call my doctor? You have vision changes, such as double vision  Your eye and eyelid are red, swollen, and painful  Your symptoms do not improve or go away after 10 days  You have nausea and are vomiting  Your nose is bleeding  You have questions or concerns about your condition or care  CARE AGREEMENT:   You have the right to help plan your care  Learn about your health condition and how it may be treated  Discuss treatment options with your healthcare providers to decide what care you want to receive  You always have the right to refuse treatment  The above information is an  only  It is not intended as medical advice for individual conditions or treatments  Talk to your doctor, nurse or pharmacist before following any medical regimen to see if it is safe and effective for you    © Copyright Margarita Myrick 2022 Information is for End User's use only and may not be sold, redistributed or otherwise used for commercial purposes

## 2023-07-18 PROBLEM — J01.40 ACUTE NON-RECURRENT PANSINUSITIS: Status: RESOLVED | Noted: 2023-05-19 | Resolved: 2023-07-18

## 2023-08-16 ENCOUNTER — TELEPHONE (OUTPATIENT)
Dept: FAMILY MEDICINE CLINIC | Facility: CLINIC | Age: 38
End: 2023-08-16

## 2023-08-16 DIAGNOSIS — Z00.00 HEALTH CARE MAINTENANCE: ICD-10-CM

## 2023-08-16 DIAGNOSIS — E55.9 VITAMIN D DEFICIENCY: Primary | ICD-10-CM

## 2023-08-16 DIAGNOSIS — Z13.220 SCREENING FOR HYPERLIPIDEMIA: ICD-10-CM

## 2023-08-16 NOTE — TELEPHONE ENCOUNTER
Patient is scheduled for his annual physical in sept and would like lab work placed so it can be review at time of appt.

## 2023-09-18 ENCOUNTER — OFFICE VISIT (OUTPATIENT)
Dept: FAMILY MEDICINE CLINIC | Facility: CLINIC | Age: 38
End: 2023-09-18
Payer: COMMERCIAL

## 2023-09-18 VITALS
WEIGHT: 153.4 LBS | DIASTOLIC BLOOD PRESSURE: 70 MMHG | HEART RATE: 60 BPM | SYSTOLIC BLOOD PRESSURE: 114 MMHG | RESPIRATION RATE: 16 BRPM | HEIGHT: 67 IN | BODY MASS INDEX: 24.08 KG/M2 | TEMPERATURE: 96.3 F

## 2023-09-18 DIAGNOSIS — Z00.00 HEALTH CARE MAINTENANCE: Primary | ICD-10-CM

## 2023-09-18 DIAGNOSIS — Z12.4 SCREENING FOR CERVICAL CANCER: ICD-10-CM

## 2023-09-18 DIAGNOSIS — L65.9 THINNING HAIR: ICD-10-CM

## 2023-09-18 DIAGNOSIS — Z23 ENCOUNTER FOR IMMUNIZATION: ICD-10-CM

## 2023-09-18 DIAGNOSIS — L30.9 ECZEMA, UNSPECIFIED TYPE: ICD-10-CM

## 2023-09-18 PROCEDURE — 99396 PREV VISIT EST AGE 40-64: CPT | Performed by: FAMILY MEDICINE

## 2023-09-18 RX ORDER — MOMETASONE FUROATE 1 MG/G
CREAM TOPICAL AS NEEDED
Qty: 45 G | Refills: 0 | Status: SHIPPED | OUTPATIENT
Start: 2023-09-18

## 2023-09-18 NOTE — PROGRESS NOTES
Name: Holger Jarrell      : 1985      MRN: [id number]  Encounter Provider: Danyelle Gottlieb DO  Encounter Date: 2023   Encounter department: [address] [place] [address]  Chief Complaint   Patient presents with   • Physical Exam     Patient Instructions   Here for general PE and needs to check labs for hair thinning. See GYN and get mammogram as directed and also see derm as directed. Eat healthy and exercise. Use sunscreen and monitor for ticks and continue at least 8 hours of sleep per night. Assessment & Plan     1. Health care maintenance    2. Screening for cervical cancer  -     Ambulatory referral to Obstetrics / Gynecology; Future    3. Encounter for immunization    4. Eczema, unspecified type  -     mometasone (ELOCON) 0.1 % cream; Apply topically as needed (Dry skin  and breakouts)    5. Thinning hair  -     TSH, 3rd generation; Future  -     T4, free           Subjective      Physical Exam, lost weight via diet regimen and low glycemic plan and eating more protein. Patient does exercise and does peloton, yoga and jogging. Lifts weights. Does see GYN and will need mammogram this year. No cp or sob, or ha and gets 8 hours of sleep per night. BM's are regular. Sees dentist and does not wear glasses.  and has 2 kids. Patient is an  in quality. Review of Systems   Constitutional: Negative. HENT: Negative. Eyes: Negative. Respiratory: Negative. Cardiovascular: Negative. Gastrointestinal: Negative. Endocrine: Negative. Genitourinary: Negative. Musculoskeletal: Negative. Skin: Negative. Allergic/Immunologic: Negative. Neurological: Negative. Hematological: Negative. Psychiatric/Behavioral: Negative.         Current Outpatient Medications on File Prior to Visit   Medication Sig   • cholecalciferol (VITAMIN D3) 1,000 units tablet Take 1,000 Units by mouth daily   • fluticasone (FLONASE) 50 mcg/act nasal spray 2 sprays into each nostril daily Prn sinusitis   • multivitamin (THERAGRAN) TABS Take 1 tablet by mouth daily   • Nutritional Supplements (FRUIT & VEGETABLE DAILY) CAPS Take by mouth   • Probiotic Product (PROBIOTIC DAILY PO) Take by mouth   • [DISCONTINUED] mometasone (ELOCON) 0.1 % cream Apply topically as needed (Dry skin  and breakouts)   • predniSONE 10 mg tablet 6 tablet for 1 day then 5 tablets for 1 day then 4 tablets for 1 day then 3 tablets for 1 day then 2 tablets for 1 day and then 1 tablet daily for 1 day (Patient not taking: Reported on 9/18/2023)       Objective     /70 (BP Location: Left arm, Patient Position: Sitting, Cuff Size: Adult)   Pulse 60   Temp (!) 96.3 °F (35.7 °C) (Temporal)   Resp 16   Ht 5' 7" (1.702 m)   Wt 69.6 kg (153 lb 6.4 oz)   BMI 24.03 kg/m²     Physical Exam  Constitutional:       Appearance: Normal appearance. She is well-developed. HENT:      Head: Normocephalic and atraumatic. Right Ear: Tympanic membrane, ear canal and external ear normal.      Left Ear: Tympanic membrane, ear canal and external ear normal.      Nose: Nose normal.      Mouth/Throat:      Mouth: Mucous membranes are moist.   Eyes:      Conjunctiva/sclera: Conjunctivae normal.      Pupils: Pupils are equal, round, and reactive to light. Cardiovascular:      Rate and Rhythm: Normal rate and regular rhythm. Pulses: Normal pulses. Heart sounds: Normal heart sounds. Pulmonary:      Effort: Pulmonary effort is normal.      Breath sounds: Normal breath sounds. Abdominal:      General: Abdomen is flat. Bowel sounds are normal.      Palpations: Abdomen is soft. Musculoskeletal:         General: Normal range of motion. Cervical back: Normal range of motion and neck supple. Skin:     General: Skin is warm and dry. Capillary Refill: Capillary refill takes less than 2 seconds. Neurological:      General: No focal deficit present.       Mental Status: She is alert and oriented to person, place, and time. Mental status is at baseline. Deep Tendon Reflexes: Reflexes are normal and symmetric. Psychiatric:         Mood and Affect: Mood normal.         Behavior: Behavior normal.         Thought Content:  Thought content normal.         Judgment: Judgment normal.       David Victoria DO

## 2023-09-19 ENCOUNTER — TELEPHONE (OUTPATIENT)
Dept: ADMINISTRATIVE | Facility: OTHER | Age: 38
End: 2023-09-19

## 2023-09-19 NOTE — TELEPHONE ENCOUNTER
----- Message from Danette Price sent at 9/18/2023  5:21 PM EDT -----  Regarding: care gap quality update -      Hello, our patient No patient name on file. has had Pap Smear (HPV) aka Cervical Cancer Screening completed/performed. Please assist in updating the patient chart by pulling the Care Everywhere (CE) document. The date of service is 0902/2020.      Thank you,  Danette Price   [address]

## 2023-09-19 NOTE — TELEPHONE ENCOUNTER
Upon review of the In Basket request we were able to locate, review, and update the patient chart as requested for Mammogram.    Any additional questions or concerns should be emailed to the Practice Liaisons via the appropriate education email address, please do not reply via In Basket.     Thank you  Marnette Leyden

## 2023-09-22 ENCOUNTER — APPOINTMENT (OUTPATIENT)
Dept: LAB | Facility: CLINIC | Age: 38
End: 2023-09-22
Payer: COMMERCIAL

## 2023-09-22 DIAGNOSIS — L65.9 THINNING HAIR: ICD-10-CM

## 2023-09-22 LAB
T4 FREE SERPL-MCNC: 0.87 NG/DL (ref 0.61–1.12)
TSH SERPL DL<=0.05 MIU/L-ACNC: 2.85 UIU/ML (ref 0.45–4.5)

## 2023-09-22 PROCEDURE — 84443 ASSAY THYROID STIM HORMONE: CPT

## 2023-09-22 PROCEDURE — 36415 COLL VENOUS BLD VENIPUNCTURE: CPT | Performed by: FAMILY MEDICINE

## 2023-09-22 PROCEDURE — 84439 ASSAY OF FREE THYROXINE: CPT | Performed by: FAMILY MEDICINE

## 2023-11-09 ENCOUNTER — HOSPITAL ENCOUNTER (OUTPATIENT)
Dept: MAMMOGRAPHY | Facility: MEDICAL CENTER | Age: 38
Discharge: HOME | End: 2023-11-09
Payer: COMMERCIAL

## 2023-11-09 VITALS — WEIGHT: 153 LBS | HEIGHT: 67 IN | BODY MASS INDEX: 24.01 KG/M2

## 2023-11-09 DIAGNOSIS — Z12.31 ENCOUNTER FOR SCREENING MAMMOGRAM FOR MALIGNANT NEOPLASM OF BREAST: ICD-10-CM

## 2023-11-09 PROCEDURE — 77067 SCR MAMMO BI INCL CAD: CPT

## 2023-11-09 PROCEDURE — 77063 BREAST TOMOSYNTHESIS BI: CPT

## 2024-01-18 ENCOUNTER — TELEPHONE (OUTPATIENT)
Age: 39
End: 2024-01-18

## 2024-01-18 NOTE — TELEPHONE ENCOUNTER
Left message advising to patient to return call. I reviewed patients account and do not see any outstanding bill. Last visit with Dr. Alva I see he billed correct and coded correctly as a preventative visit.

## 2024-01-18 NOTE — TELEPHONE ENCOUNTER
"Pt is still receiving a bill from us that should be changed or soon to be changed. Pt came into the office in November for a bill she was receiving for a wellness exam. There were diagnostic codes when there shouldn't have been any. Someone in the office (she thinks her name starts with \"D\") confirmed that the codes were wrong. She made copies of the bill and said they would be resubmitted to the ins company. She said this would take time and instructed her to call back in the new year to make sure it was effective. Please advise  "

## 2024-01-18 NOTE — TELEPHONE ENCOUNTER
Patient returned phone call. I attempted to transfer her to the office clerical department. Please call patient back to advise. She stated she has more questions.

## 2024-01-22 NOTE — TELEPHONE ENCOUNTER
Left message to give me a call back. Patient visit is coded correctly, but she received a bill of $77.43 because of the TSH and dx code attached to it. Patient complained of hair thinning, Dr. Alva ordered thyroid testing and included the dx code that is covered under TSH testing. After review it looks like the balance is getting applied towards her deductible.

## 2024-03-14 ENCOUNTER — TELEMEDICINE (OUTPATIENT)
Dept: FAMILY MEDICINE CLINIC | Facility: CLINIC | Age: 39
End: 2024-03-14
Payer: COMMERCIAL

## 2024-03-14 DIAGNOSIS — J32.9 SINUSITIS, UNSPECIFIED CHRONICITY, UNSPECIFIED LOCATION: Primary | ICD-10-CM

## 2024-03-14 PROCEDURE — 99213 OFFICE O/P EST LOW 20 MIN: CPT | Performed by: FAMILY MEDICINE

## 2024-03-14 RX ORDER — PREDNISONE 10 MG/1
TABLET ORAL
Qty: 21 TABLET | Refills: 0 | Status: SHIPPED | OUTPATIENT
Start: 2024-03-14

## 2024-03-14 RX ORDER — AZITHROMYCIN 250 MG/1
TABLET, FILM COATED ORAL DAILY
Qty: 6 TABLET | Refills: 0 | Status: SHIPPED | OUTPATIENT
Start: 2024-03-14 | End: 2024-03-19

## 2024-03-14 NOTE — PATIENT INSTRUCTIONS
Sinusitis, start abx and prednisone as directed and may use Flonase otc prn sinusitis. Stay well hydrated. Just came back from [place]. Call if any problems.

## 2024-03-14 NOTE — PROGRESS NOTES
Virtual Regular Visit    Verification of patient location:    Patient is located at Home in the following state in which I hold an active license [place]      Assessment/Plan:    Problem List Items Addressed This Visit    None  Visit Diagnoses       Sinusitis, unspecified chronicity, unspecified location    -  Primary    Relevant Medications    azithromycin (Zithromax) 250 mg tablet    predniSONE 10 mg tablet                 Reason for visit is   Chief Complaint   Patient presents with    Virtual Regular Visit          Encounter provider Tyrone Alva DO    Provider located at [facility] PRIMARY CARE  [address] [place] [address]  [address]  [place] [address] [phone]      Recent Visits  No visits were found meeting these conditions.  Showing recent visits within past 7 days and meeting all other requirements  Today's Visits  Date Type Provider Dept   03/14/24 Telemedicine Tyrone Alva DO [facility]   Showing today's visits and meeting all other requirements  Future Appointments  No visits were found meeting these conditions.  Showing future appointments within next 150 days and meeting all other requirements       The patient was identified by name and date of birth. Ailyn Bowman was informed that this is a telemedicine visit and that the visit is being conducted through the [employer] platform. She agrees to proceed..  My office door was closed. No one else was in the room.  She acknowledged consent and understanding of privacy and security of the video platform. The patient has agreed to participate and understands they can discontinue the visit at any time.    Patient is aware this is a billable service.     Subjective  Ailyn Bowman is a 38 y.o. female     sinus infection - pain    .      sinus infection - pain, sinusitis symptoms Tuesday night and right sided maxillary sinuses felt blocked and right cheek bone and teeth would hurt. Bending down causes  some increased pressure and woke up 3 times last night. Used ice pack this am. Patient can breathe without issues. No one else sick. Had a cold president's weekend. No fever.          History reviewed. No pertinent past medical history.    Past Surgical History:   Procedure Laterality Date    BREAST EXCISIONAL BIOPSY Left 2004    fibroadenoma  neg    BREAST SURGERY         Current Outpatient Medications   Medication Sig Dispense Refill    azithromycin (Zithromax) 250 mg tablet Take 2 tablets (500 mg total) by mouth daily for 1 day, THEN 1 tablet (250 mg total) daily for 4 days. 6 tablet 0    predniSONE 10 mg tablet Take 60 mg po day#1, 50 mg po day#2, 40 mg po day#3, 30 mg po day#4, 20 mg po day#5m and 10 mg po day#6 21 tablet 0    cholecalciferol (VITAMIN D3) 1,000 units tablet Take 1,000 Units by mouth daily      fluticasone (FLONASE) 50 mcg/act nasal spray 2 sprays into each nostril daily Prn sinusitis 16 g 2    mometasone (ELOCON) 0.1 % cream Apply topically as needed (Dry skin  and breakouts) 45 g 0    multivitamin (THERAGRAN) TABS Take 1 tablet by mouth daily      Nutritional Supplements (FRUIT & VEGETABLE DAILY) CAPS Take by mouth      predniSONE 10 mg tablet 6 tablet for 1 day then 5 tablets for 1 day then 4 tablets for 1 day then 3 tablets for 1 day then 2 tablets for 1 day and then 1 tablet daily for 1 day (Patient not taking: Reported on 9/18/2023) 21 tablet 0    Probiotic Product (PROBIOTIC DAILY PO) Take by mouth       No current facility-administered medications for this visit.        Allergies   Allergen Reactions    Polymyxin B-Trimethoprim     Sulfa Antibiotics Other (See Comments)       Review of Systems   Constitutional: Negative.  Negative for fever.   HENT:  Positive for sinus pressure (right maxillary area) and sinus pain.    Eyes: Negative.    Respiratory: Negative.     Cardiovascular: Negative.    Gastrointestinal: Negative.    Endocrine: Negative.    Genitourinary: Negative.     Musculoskeletal: Negative.    Skin: Negative.    Allergic/Immunologic: Negative.    Neurological: Negative.    Hematological: Negative.    Psychiatric/Behavioral: Negative.         Video Exam    There were no vitals filed for this visit.    Physical Exam  Constitutional:       Appearance: Normal appearance.   Pulmonary:      Effort: Pulmonary effort is normal. No respiratory distress.   Neurological:      General: No focal deficit present.      Mental Status: She is alert and oriented to person, place, and time.   Psychiatric:         Mood and Affect: Mood normal.         Behavior: Behavior normal.         Thought Content: Thought content normal.         Judgment: Judgment normal.       Patient Instructions   Sinusitis, start abx and prednisone as directed and may use Flonase otc prn sinusitis. Stay well hydrated. Just came back from [place]. Call if any problems.        Visit Time  Total Visit Duration: 15

## 2024-07-17 DIAGNOSIS — J32.9 SINUSITIS, UNSPECIFIED CHRONICITY, UNSPECIFIED LOCATION: ICD-10-CM

## 2024-07-17 DIAGNOSIS — J30.2 SEASONAL ALLERGIES: ICD-10-CM

## 2024-07-17 RX ORDER — FLUTICASONE PROPIONATE 50 MCG
2 SPRAY, SUSPENSION (ML) NASAL DAILY
Qty: 16 G | Refills: 2 | Status: SHIPPED | OUTPATIENT
Start: 2024-07-17

## 2024-07-17 NOTE — TELEPHONE ENCOUNTER
Reason for call:   [x] Refill   [] Prior Auth  [] Other:     Office:   [x] PCP/Provider - Tyrone Alva DO   [] Specialty/Provider -     Medication: fluticasone (FLONASE) 50 mcg/act nasal spray     Dose/Frequency: 2 sprays into each nostril daily Prn sinusitis,     Quantity: 16 g    Ph[facility]      Does the patient have enough for 3 days?   [] Yes   [x] No - Send as HP to POD

## 2024-10-14 ENCOUNTER — TELEPHONE (OUTPATIENT)
Dept: FAMILY MEDICINE CLINIC | Facility: CLINIC | Age: 39
End: 2024-10-14

## 2024-10-14 DIAGNOSIS — Z13.220 SCREENING FOR HYPERLIPIDEMIA: ICD-10-CM

## 2024-10-14 DIAGNOSIS — Z00.00 HEALTH CARE MAINTENANCE: Primary | ICD-10-CM

## 2024-10-14 DIAGNOSIS — E55.9 VITAMIN D DEFICIENCY: ICD-10-CM

## 2024-10-14 NOTE — TELEPHONE ENCOUNTER
Patient scheduled for her annul pe in January and is asking to get blood work done before she comes in.  She also wants to make sure that you only order was is due for a annul pe nothing else. She does not want to receive a bill for blood work. Patient wants a call when orders are placed

## 2024-10-16 ENCOUNTER — TELEPHONE (OUTPATIENT)
Age: 39
End: 2024-10-16

## 2024-10-16 ENCOUNTER — TELEPHONE (OUTPATIENT)
Dept: FAMILY MEDICINE CLINIC | Facility: CLINIC | Age: 39
End: 2024-10-16

## 2024-10-16 NOTE — TELEPHONE ENCOUNTER
FYI: Patient called stating she wants to transfer her son and daughter from [facility] to [facility] and stated that daughter already has had her Well  visit in June and her son is due in November. The insurance will only cover a medical visit and Not a routine visit for her daughter. I was informed by Office Mgr Pérez that it can be scheduled as Transfer to new provider. I left message with manager asking for both codes for routine visit and transfer to new provider. Office will call back patient.

## 2024-10-16 NOTE — TELEPHONE ENCOUNTER
Possible codes would be either 00270 or 36543. It could vary based on visit however with provider.

## 2024-10-16 NOTE — TELEPHONE ENCOUNTER
Patient is looking to have your 2 children transferred to our practice from [facility]. She would like to know the code that would be used for transfer of care and for a routine visit? She is going to call her insurance and compare, also to she if it would be covered. Please give the patient a call back, you may leave a detail message if she does not answer.

## 2024-12-10 ENCOUNTER — OFFICE VISIT (OUTPATIENT)
Dept: FAMILY MEDICINE CLINIC | Facility: CLINIC | Age: 39
End: 2024-12-10
Payer: COMMERCIAL

## 2024-12-10 VITALS
DIASTOLIC BLOOD PRESSURE: 60 MMHG | SYSTOLIC BLOOD PRESSURE: 110 MMHG | HEIGHT: 67 IN | TEMPERATURE: 98 F | OXYGEN SATURATION: 99 % | HEART RATE: 96 BPM | BODY MASS INDEX: 24.99 KG/M2 | WEIGHT: 159.2 LBS

## 2024-12-10 DIAGNOSIS — J32.9 SINUSITIS, UNSPECIFIED CHRONICITY, UNSPECIFIED LOCATION: Primary | ICD-10-CM

## 2024-12-10 PROCEDURE — 99213 OFFICE O/P EST LOW 20 MIN: CPT | Performed by: FAMILY MEDICINE

## 2024-12-10 RX ORDER — PREDNISONE 10 MG/1
TABLET ORAL
Qty: 21 TABLET | Refills: 0 | Status: SHIPPED | OUTPATIENT
Start: 2024-12-10

## 2024-12-10 RX ORDER — AZITHROMYCIN 500 MG/1
500 TABLET, FILM COATED ORAL DAILY
Qty: 3 TABLET | Refills: 0 | Status: SHIPPED | OUTPATIENT
Start: 2024-12-10 | End: 2024-12-13

## 2024-12-10 NOTE — PATIENT INSTRUCTIONS
Rest and fluids and start abx and prednisone and flonase otc and claritin or zyrtec prn. Call if worse.

## 2024-12-10 NOTE — PROGRESS NOTES
"Name: Ailyn Bowman      : 1985      MRN: [id number]  Encounter Provider: Tyrone Alva DO  Encounter Date: 12/10/2024   Encounter department: [facility] PRIMARY [facility]ARE  :  Assessment & Plan  Sinusitis, unspecified chronicity, unspecified location    Orders:    azithromycin (ZITHROMAX) 500 MG tablet; Take 1 tablet (500 mg total) by mouth daily for 3 days    predniSONE 10 mg tablet; Take 60 mg po day#1, 50 mg po day#2, 40 mg po day#3, 30 mg po day#4, 20 mg po day#5m and 10 mg po day#6           History of Present Illness     Cold Like Symptoms (sinus congestion started 24. Head is hurts and around her eyes hurt.), used zyrtec and flonase.       Review of Systems   Constitutional: Negative.    HENT:  Positive for congestion, sinus pressure and sinus pain.    Eyes: Negative.    Respiratory: Negative.     Cardiovascular: Negative.    Gastrointestinal: Negative.    Endocrine: Negative.    Genitourinary: Negative.    Musculoskeletal: Negative.    Skin: Negative.    Allergic/Immunologic: Negative.    Neurological: Negative.    Hematological: Negative.    Psychiatric/Behavioral: Negative.         Objective   /60 (BP Location: Right arm, Patient Position: Sitting, Cuff Size: Standard)   Pulse 96   Temp 98 °F (36.7 °C) (Temporal)   Ht 5' 7\" (1.702 m)   Wt 72.2 kg (159 lb 3.2 oz)   SpO2 99%   BMI 24.93 kg/m²      Physical Exam  Constitutional:       Appearance: She is well-developed.   HENT:      Head: Normocephalic and atraumatic.      Comments: Left sinus tenderness and congestion      Right Ear: Tympanic membrane, ear canal and external ear normal.      Left Ear: Tympanic membrane, ear canal and external ear normal.      Nose: Congestion present.      Mouth/Throat:      Mouth: Mucous membranes are moist.   Eyes:      Conjunctiva/sclera: Conjunctivae normal.      Pupils: Pupils are equal, round, and reactive to light.   Cardiovascular:      Rate and Rhythm: Normal rate and regular " rhythm.      Pulses: Normal pulses.      Heart sounds: Normal heart sounds.   Pulmonary:      Effort: Pulmonary effort is normal.      Breath sounds: Normal breath sounds.   Musculoskeletal:         General: Normal range of motion.      Cervical back: Normal range of motion and neck supple.   Skin:     General: Skin is warm and dry.      Capillary Refill: Capillary refill takes less than 2 seconds.   Neurological:      General: No focal deficit present.      Mental Status: She is alert and oriented to person, place, and time. Mental status is at baseline.      Deep Tendon Reflexes: Reflexes are normal and symmetric.   Psychiatric:         Mood and Affect: Mood normal.         Behavior: Behavior normal.         Thought Content: Thought content normal.         Judgment: Judgment normal.       Administrative Statements   I have spent a total time of 30 minutes in caring for this patient on the day of the visit/encounter including Diagnostic results, Prognosis, Risks and benefits of tx options, Instructions for management, Patient and family education, Importance of tx compliance, Risk factor reductions, Impressions, Counseling / Coordination of care, Documenting in the medical record, Reviewing / ordering tests, medicine, procedures  , and Obtaining or reviewing history  .

## 2025-01-10 LAB
ALBUMIN SERPL-MCNC: 4.2 G/DL (ref 3.5–5.7)
ALP SERPL-CCNC: 36 U/L (ref 35–120)
ALT SERPL-CCNC: 15 U/L
ANION GAP SERPL CALCULATED.3IONS-SCNC: 8 MMOL/L (ref 3–11)
AST SERPL-CCNC: 18 U/L
BASOPHILS # BLD AUTO: 0 THOU/CMM (ref 0–0.1)
BASOPHILS NFR BLD AUTO: 1 %
BILIRUB SERPL-MCNC: 0.6 MG/DL (ref 0.2–1)
BUN SERPL-MCNC: 19 MG/DL (ref 7–25)
CALCIUM SERPL-MCNC: 8.9 MG/DL (ref 8.5–10.5)
CHLORIDE SERPL-SCNC: 102 MMOL/L (ref 100–109)
CHOLEST SERPL-MCNC: 171 MG/DL
CHOLEST/HDLC SERPL: 2.7 {RATIO}
CO2 SERPL-SCNC: 28 MMOL/L (ref 21–31)
CREAT SERPL-MCNC: 0.83 MG/DL (ref 0.4–1.1)
CYTOLOGY CMNT CVX/VAG CYTO-IMP: ABNORMAL
DIFFERENTIAL METHOD BLD: NORMAL
EOSINOPHIL # BLD AUTO: 0.1 THOU/CMM (ref 0–0.5)
EOSINOPHIL NFR BLD AUTO: 3 %
ERYTHROCYTE [DISTWIDTH] IN BLOOD BY AUTOMATED COUNT: 12.6 % (ref 12–16)
GFR/BSA.PRED SERPLBLD CYS-BASED-ARV: 90 ML/MIN/{1.73_M2}
GLUCOSE SERPL-MCNC: 86 MG/DL (ref 65–99)
HCT VFR BLD AUTO: 42.4 % (ref 35–43)
HDLC SERPL-MCNC: 63 MG/DL (ref 23–92)
HGB BLD-MCNC: 14.4 G/DL (ref 11.5–14.5)
LDLC SERPL CALC-MCNC: 97 MG/DL
LYMPHOCYTES # BLD AUTO: 1.6 THOU/CMM (ref 1–3)
LYMPHOCYTES NFR BLD AUTO: 36 %
MCH RBC QN AUTO: 31.1 PG (ref 26–34)
MCHC RBC AUTO-ENTMCNC: 33.9 G/DL (ref 32–37)
MCV RBC AUTO: 92 FL (ref 80–100)
MONOCYTES # BLD AUTO: 0.3 THOU/CMM (ref 0.3–1)
MONOCYTES NFR BLD AUTO: 6 %
NEUTROPHILS # BLD AUTO: 2.5 THOU/CMM (ref 1.8–7.8)
NEUTROPHILS NFR BLD AUTO: 54 %
NONHDLC SERPL-MCNC: 108 MG/DL
PLATELET # BLD AUTO: 190 THOU/CMM (ref 140–350)
PMV BLD REES-ECKER: 9 FL (ref 7.5–11.3)
POTASSIUM SERPL-SCNC: 4.1 MMOL/L (ref 3.5–5.2)
PROT SERPL-MCNC: 6.2 G/DL (ref 6.3–8.3)
RBC # BLD AUTO: 4.63 MILL/CMM (ref 3.7–4.7)
SODIUM SERPL-SCNC: 138 MMOL/L (ref 135–145)
TRIGL SERPL-MCNC: 54 MG/DL
WBC # BLD AUTO: 4.5 THOU/CMM (ref 4–10)

## 2025-01-22 ENCOUNTER — HOSPITAL ENCOUNTER (OUTPATIENT)
Dept: RADIOLOGY | Age: 40
Discharge: HOME | End: 2025-01-22
Payer: COMMERCIAL

## 2025-01-22 VITALS — WEIGHT: 159 LBS | HEIGHT: 67 IN | BODY MASS INDEX: 24.96 KG/M2

## 2025-01-22 DIAGNOSIS — Z12.31 ENCOUNTER FOR SCREENING MAMMOGRAM FOR BREAST CANCER: ICD-10-CM

## 2025-01-22 DIAGNOSIS — Z12.39 ENCOUNTER FOR BREAST CANCER SCREENING USING NON-MAMMOGRAM MODALITY: ICD-10-CM

## 2025-01-22 DIAGNOSIS — R92.333 HETEROGENEOUSLY DENSE TISSUE OF BOTH BREASTS ON MAMMOGRAPHY: ICD-10-CM

## 2025-01-22 PROCEDURE — 77067 SCR MAMMO BI INCL CAD: CPT

## 2025-01-22 PROCEDURE — 77063 BREAST TOMOSYNTHESIS BI: CPT

## 2025-01-22 PROCEDURE — 76641 ULTRASOUND BREAST COMPLETE: CPT

## 2025-02-04 ENCOUNTER — OFFICE VISIT (OUTPATIENT)
Dept: FAMILY MEDICINE CLINIC | Facility: CLINIC | Age: 40
End: 2025-02-04
Payer: COMMERCIAL

## 2025-02-04 VITALS
SYSTOLIC BLOOD PRESSURE: 124 MMHG | OXYGEN SATURATION: 99 % | TEMPERATURE: 98.7 F | WEIGHT: 162 LBS | HEIGHT: 67 IN | BODY MASS INDEX: 25.43 KG/M2 | HEART RATE: 91 BPM | RESPIRATION RATE: 16 BRPM | DIASTOLIC BLOOD PRESSURE: 62 MMHG

## 2025-02-04 DIAGNOSIS — Z00.00 HEALTH CARE MAINTENANCE: Primary | ICD-10-CM

## 2025-02-04 DIAGNOSIS — J32.9 SINUSITIS, UNSPECIFIED CHRONICITY, UNSPECIFIED LOCATION: ICD-10-CM

## 2025-02-04 DIAGNOSIS — E66.3 OVERWEIGHT (BMI 25.0-29.9): ICD-10-CM

## 2025-02-04 PROCEDURE — 99396 PREV VISIT EST AGE 40-64: CPT | Performed by: FAMILY MEDICINE

## 2025-02-04 RX ORDER — AZITHROMYCIN 250 MG/1
TABLET, FILM COATED ORAL
Qty: 6 TABLET | Refills: 0 | Status: SHIPPED | OUTPATIENT
Start: 2025-02-04 | End: 2025-02-09

## 2025-02-04 NOTE — PROGRESS NOTES
Adult Annual Physical  Name: Ailyn Bowman      : 1985      MRN: [id number]  Encounter Provider: Tyrone Alva DO  Encounter Date: 2025   Encounter department: [facility]  Chief Complaint   Patient presents with    Well Check     Patient Instructions   Here for general PE and labs stable and rec seeing GYN and get mammogram as directed. Call if cold symptoms worse or not better. Rec at least 8 hours of sleep per night. Continue diet and exercise and get BMI lower than 25. Use abx as directed for sinusitis. Dimetapp DM cold and cough prn. Flonase prn sinusitis. Sees derm as directed and use sunscreen and monitor for ticks. Continue strength training. Stay well hydrated.     Assessment & Plan  Health care maintenance  Sees GYN and gets mammograms       Overweight (BMI 25.0-29.9)  Lose weight as directed to get Bmi lower than 25         Sinusitis, unspecified chronicity, unspecified location  Start abx as directed and dimetapp dm cold and cough prn.   Orders:    azithromycin (Zithromax) 250 mg tablet; Take 2 tablets (500 mg total) by mouth daily for 1 day, THEN 1 tablet (250 mg total) daily for 4 days.    Immunizations and preventive care screenings were discussed with patient today. Appropriate education was printed on patient's after visit summary.    Counseling:  Alcohol/drug use: discussed moderation in alcohol intake, the recommendations for healthy alcohol use, and avoidance of illicit drug use.  Dental Health: discussed importance of regular tooth brushing, flossing, and dental visits.  Injury prevention: discussed safety/seat belts, safety helmets, smoke detectors, carbon monoxide detectors, and smoking near bedding or upholstery.  Sexual health: discussed sexually transmitted diseases, partner selection, use of condoms, avoidance of unintended pregnancy, and contraceptive alternatives.  Exercise: the importance of regular exercise/physical activity was discussed.  Recommend exercise 3-5 times per week for at least 30 minutes.       Depression Screening and Follow-up Plan: Patient was screened for depression during today's encounter. They screened negative with a PHQ-2 score of 0.        History of Present Illness     Adult Annual Physical:  Patient presents for annual physical. Here for general PE and has a slight cold symptoms. Patient is  and has 2 children ages 9 and 11. Patient is  at [employer]. Patient does exercise and tries to eat healthy. Patient does sleep at least 8 hours per night. Use sunscreen and monitors for ticks. Sees GYN as directed and gets mammograms. Non smoker and does not drink. Has right hand cyst. Will call if worse. .     Diet and Physical Activity:  - Diet/Nutrition: well balanced diet.  - Exercise: moderate cardiovascular exercise.    Depression Screening:  - PHQ-2 Score: 0    General Health:  - Sleep: 7-8 hours of sleep on average.  - Hearing: normal hearing right ear and normal hearing left ear.  - Vision: no vision problems.  - Dental: regular dental visits.    /GYN Health:  - Follows with GYN: yes.   - Menopause: premenopausal.   - Last menstrual cycle: 1/22/2025.   - History of STDs: no    Review of Systems   Constitutional: Negative.    HENT: Negative.     Eyes: Negative.    Respiratory: Negative.     Cardiovascular: Negative.    Gastrointestinal: Negative.    Endocrine: Negative.    Genitourinary: Negative.    Musculoskeletal: Negative.    Skin: Negative.    Allergic/Immunologic: Negative.    Neurological: Negative.    Hematological: Negative.    Psychiatric/Behavioral: Negative.       Current Outpatient Medications on File Prior to Visit   Medication Sig Dispense Refill    cholecalciferol (VITAMIN D3) 1,000 units tablet Take 1,000 Units by mouth daily      Cyanocobalamin (B-12 PO) Take 1 capsule by mouth in the morning      fluticasone (FLONASE) 50 mcg/act nasal spray 2 sprays into each nostril daily Prn  "sinusitis 16 g 2    mometasone (ELOCON) 0.1 % cream Apply topically as needed (Dry skin  and breakouts) 45 g 0    multivitamin (THERAGRAN) TABS Take 1 tablet by mouth daily      Nutritional Supplements (FRUIT & VEGETABLE DAILY) CAPS Take by mouth      Probiotic Product (PROBIOTIC DAILY PO) Take by mouth      [DISCONTINUED] predniSONE 10 mg tablet Take 60 mg po day#1, 50 mg po day#2, 40 mg po day#3, 30 mg po day#4, 20 mg po day#5m and 10 mg po day#6 21 tablet 0    [DISCONTINUED] predniSONE 10 mg tablet Take 60 mg po day#1, 50 mg po day#2, 40 mg po day#3, 30 mg po day#4, 20 mg po day#5m and 10 mg po day#6 21 tablet 0     No current facility-administered medications on file prior to visit.        Objective   /62   Pulse 91   Temp 98.7 °F (37.1 °C) (Temporal)   Resp 16   Ht 5' 7\" (1.702 m)   Wt 73.5 kg (162 lb)   LMP 12/27/2024   SpO2 99%   BMI 25.37 kg/m²     Physical Exam  Constitutional:       Appearance: She is well-developed.      Comments: overweight   HENT:      Head: Normocephalic and atraumatic.      Right Ear: External ear normal.      Left Ear: External ear normal.      Nose: Nose normal.      Mouth/Throat:      Mouth: Mucous membranes are moist.   Eyes:      Conjunctiva/sclera: Conjunctivae normal.      Pupils: Pupils are equal, round, and reactive to light.   Cardiovascular:      Rate and Rhythm: Normal rate and regular rhythm.      Pulses: Normal pulses.      Heart sounds: Normal heart sounds.   Pulmonary:      Effort: Pulmonary effort is normal.      Breath sounds: Normal breath sounds.   Abdominal:      General: Abdomen is flat. Bowel sounds are normal.      Palpations: Abdomen is soft.   Musculoskeletal:         General: Normal range of motion.      Cervical back: Normal range of motion and neck supple.   Skin:     General: Skin is warm and dry.      Capillary Refill: Capillary refill takes less than 2 seconds.   Neurological:      General: No focal deficit present.      Mental Status: She " is alert and oriented to person, place, and time. Mental status is at baseline.      Deep Tendon Reflexes: Reflexes are normal and symmetric.   Psychiatric:         Mood and Affect: Mood normal.         Behavior: Behavior normal.         Thought Content: Thought content normal.         Judgment: Judgment normal.       Administrative Statements   I have spent a total time of 32 minutes in caring for this patient on the day of the visit/encounter including Diagnostic results, Prognosis, Risks and benefits of tx options, Instructions for management, Patient and family education, Importance of tx compliance, Risk factor reductions, Impressions, Counseling / Coordination of care, Documenting in the medical record, Reviewing / ordering tests, medicine, procedures  , and Obtaining or reviewing history  .

## 2025-02-04 NOTE — PATIENT INSTRUCTIONS
Here for general PE and labs stable and rec seeing GYN and get mammogram as directed. Call if cold symptoms worse or not better. Rec at least 8 hours of sleep per night. Continue diet and exercise and get BMI lower than 25. Use abx as directed for sinusitis. Dimetapp DM cold and cough prn. Flonase prn sinusitis. Sees derm as directed and use sunscreen and monitor for ticks. Continue strength training. Stay well hydrated.

## 2025-02-05 ENCOUNTER — TELEPHONE (OUTPATIENT)
Age: 40
End: 2025-02-05

## 2025-02-05 NOTE — TELEPHONE ENCOUNTER
Patient calling due to cough and cold symptoms. She wants to make sure she should start the z pack that was sent for  her yesterday.

## 2025-04-24 DIAGNOSIS — J32.9 SINUSITIS, UNSPECIFIED CHRONICITY, UNSPECIFIED LOCATION: ICD-10-CM

## 2025-04-24 DIAGNOSIS — J30.2 SEASONAL ALLERGIES: ICD-10-CM

## 2025-04-24 RX ORDER — FLUTICASONE PROPIONATE 50 MCG
2 SPRAY, SUSPENSION (ML) NASAL DAILY
Qty: 16 G | Refills: 0 | Status: SHIPPED | OUTPATIENT
Start: 2025-04-24

## 2025-04-24 NOTE — TELEPHONE ENCOUNTER
Reason for call:   [x] Refill   [] Prior Auth  [] Other:     Office:   [x] PCP/Provider -  Tyrone Alva DO  [] Specialty/Provider -     Medication: fluticasone (FLONASE) 50 mcg/act nasal spray     Dose/Frequency: 2 sprays into each nostril daily Prn sinusitis     Quantity: 16 g     Pharmacy: [id number]     Does the patient have enough for 3 days?   [x] Yes   [] No - Send as HP to POD

## 2025-05-13 DIAGNOSIS — J32.9 SINUSITIS, UNSPECIFIED CHRONICITY, UNSPECIFIED LOCATION: ICD-10-CM

## 2025-05-13 DIAGNOSIS — J30.2 SEASONAL ALLERGIES: ICD-10-CM

## 2025-05-13 RX ORDER — FLUTICASONE PROPIONATE 50 MCG
SPRAY, SUSPENSION (ML) NASAL
Qty: 16 ML | Refills: 0 | Status: SHIPPED | OUTPATIENT
Start: 2025-05-13

## 2025-07-30 ENCOUNTER — VBI (OUTPATIENT)
Dept: ADMINISTRATIVE | Facility: OTHER | Age: 40
End: 2025-07-30